# Patient Record
Sex: FEMALE | Race: WHITE | Employment: PART TIME | URBAN - METROPOLITAN AREA
[De-identification: names, ages, dates, MRNs, and addresses within clinical notes are randomized per-mention and may not be internally consistent; named-entity substitution may affect disease eponyms.]

---

## 2017-03-16 ENCOUNTER — VIRTUAL VISIT (OUTPATIENT)
Dept: FAMILY MEDICINE | Facility: OTHER | Age: 32
End: 2017-03-16

## 2017-03-17 NOTE — PROGRESS NOTES
Date:   Clinician: Breonna Waller  Clinician NPI: [id number]  Patient: Maria T Ruano  Patient : 1985  Patient Address: [address] [place], [place] [address]  Patient Phone: [phone]  Visit Protocol: URI  Patient Summary:  Maria T is a 31 year old ( : 1985 ) female who initiated a Zip for evaluation of Swimmer's ear (ear pain).     She denies headache.   Regarding the ear pain, the patient denies experiencing pain when gently pulling on the earlobe and recent injury to the area around the ear.   She reports having moderate ear pain on the ear canal area of the right ear for 1 day. The patient hears normally despite the ear pain.   In addition to the ear pain, Maria T also reports having the following symptoms:      A feeling of fullness in the ear as if it is clogged    Tinnitus     Maria T denies having swelling, tenderness, and redness on her ear.   Additionally, she finds the pain is worse while eating or chewing, finds the pain worsens if the mouth is open fully or the teeth are clenched, and does not experience pain when bending the chin to the chest.   She has never had tympanostomy tube placement. In the last two weeks the patient has had dental work or pain the mouth.   Within the past two (2) years she has had one episode of otitis media. Antibiotics were effective in treating the previous episode(s) of otitis media.   Maria T denies having COPD or other chronic lung disease.    Weight (in lbs): 288   She states she is not pregnant and denies breastfeeding. She is currently menstruating.   Maria T does not smoke or use smokeless tobacco.  MEDICATIONS:  Paroxetine (Paxil, Pexeva)  , ALLERGIES:    Patient free text response:   Pertussis vaccine     Clinician Response:  Dear Maria T,  Based upon the information you provided, you may have otitis externa. External otitis, also known as swimmer's ear, is a condition that occurs when the ear canal becomes irritated or  infected.  I am prescribing Neomycin/polymyxin B/hydrocortisone otic (Cortisporin) 1% Otic Solution. Instill 4 drops in the affected ear 3 to 4 times a day for 7 days.   Unless your are allergic to the over-the-counter medication(s) below, I recommend using:   Ibuprofen. Take 1-3 200mg tablets (200-600mg) every 8 hours to help with the discomfort. Make sure to take the ibuprofen with food. Do not exceed 2400mg in 24 hours. This is an over-the-counter medication that does not require a prescription.   Use of eardrops:     Lie on your side or tilt your head.    Insert the drops into your ear canal    Lie on your side or insert a cotton ball in the ear canal for 20 minutes    Use the medication for the number of days recommended, even if you begin to feel better within a few days after starting the drops.      You should start to feel better within 1-2 days. If your pain is increasing or is just not getting better, please see your health care clinician or urgent care clinic for follow up.   Avoid getting water inside your ear while you are being treated for swimmer's ear.  Use a cotton ball coated with petroleum jelly to protect your ears when bathing and showering.  Do not go swimming or diving for the next 7-10 days.   Some people develop allergies to antibiotics. If you notice a new rash, significant swelling or difficulty breathing, stop the medication immediately and go into a clinic for physical evaluation.   Some women may also develop a yeast infection as a side effect of taking antibiotics. If you notice symptoms of a yeast infection, please use Zipnosis to get treatment.   If you become pregnant during this course of treatment with medication, stop taking the medication and contact your primary care clinician.   Diagnosis: Otitis Externa  Diagnosis ICD: H60.399  Prescription: neomycin/polymyxin B/hydrocortisone otic (Cortisporin) 3.5 mg-10,000 units-10 mg 1ml otic solution 10 ml, 7 days supply. Instill 4  drops in the affected ear 3 to 4 times daily for 7 days. Refills: 0, Refill as needed: no, Allow substitutions: yes  Prescription Sent At: March 16 22:17:20, 2017  Pharmacy: [facility]/pharmacy #[id number] - [phone] - [address] [phone]

## 2017-03-29 ENCOUNTER — TRANSFERRED RECORDS (OUTPATIENT)
Dept: HEALTH INFORMATION MANAGEMENT | Facility: CLINIC | Age: 32
End: 2017-03-29

## 2017-05-30 ENCOUNTER — OFFICE VISIT (OUTPATIENT)
Dept: FAMILY MEDICINE | Facility: CLINIC | Age: 32
End: 2017-05-30
Payer: COMMERCIAL

## 2017-05-30 VITALS
WEIGHT: 293 LBS | HEART RATE: 116 BPM | HEIGHT: 66 IN | DIASTOLIC BLOOD PRESSURE: 98 MMHG | RESPIRATION RATE: 18 BRPM | TEMPERATURE: 98.6 F | BODY MASS INDEX: 47.09 KG/M2 | OXYGEN SATURATION: 95 % | SYSTOLIC BLOOD PRESSURE: 134 MMHG

## 2017-05-30 DIAGNOSIS — R30.0 DYSURIA: ICD-10-CM

## 2017-05-30 DIAGNOSIS — R35.0 URINARY FREQUENCY: Primary | ICD-10-CM

## 2017-05-30 DIAGNOSIS — N30.00 ACUTE CYSTITIS WITHOUT HEMATURIA: ICD-10-CM

## 2017-05-30 LAB
ALBUMIN UR-MCNC: NEGATIVE MG/DL
APPEARANCE UR: CLEAR
BILIRUB UR QL STRIP: NEGATIVE
COLOR UR AUTO: YELLOW
GLUCOSE UR STRIP-MCNC: NEGATIVE MG/DL
HGB UR QL STRIP: NEGATIVE
KETONES UR STRIP-MCNC: NEGATIVE MG/DL
LEUKOCYTE ESTERASE UR QL STRIP: ABNORMAL
MUCOUS THREADS #/AREA URNS LPF: PRESENT /LPF
NITRATE UR QL: NEGATIVE
PH UR STRIP: 5 PH (ref 5–7)
RBC #/AREA URNS AUTO: 1 /HPF (ref 0–2)
SP GR UR STRIP: 1.02 (ref 1–1.03)
SQUAMOUS #/AREA URNS AUTO: 2 /HPF (ref 0–1)
URN SPEC COLLECT METH UR: ABNORMAL
UROBILINOGEN UR STRIP-MCNC: 0 MG/DL (ref 0–2)
WBC #/AREA URNS AUTO: 3 /HPF (ref 0–2)

## 2017-05-30 PROCEDURE — 81001 URINALYSIS AUTO W/SCOPE: CPT | Performed by: FAMILY MEDICINE

## 2017-05-30 PROCEDURE — 99214 OFFICE O/P EST MOD 30 MIN: CPT | Performed by: FAMILY MEDICINE

## 2017-05-30 RX ORDER — NITROFURANTOIN 25; 75 MG/1; MG/1
100 CAPSULE ORAL 2 TIMES DAILY
Qty: 14 CAPSULE | Refills: 0 | Status: SHIPPED | OUTPATIENT
Start: 2017-05-30 | End: 2017-08-21

## 2017-05-30 ASSESSMENT — ANXIETY QUESTIONNAIRES
7. FEELING AFRAID AS IF SOMETHING AWFUL MIGHT HAPPEN: NOT AT ALL
5. BEING SO RESTLESS THAT IT IS HARD TO SIT STILL: SEVERAL DAYS
3. WORRYING TOO MUCH ABOUT DIFFERENT THINGS: NOT AT ALL
2. NOT BEING ABLE TO STOP OR CONTROL WORRYING: NOT AT ALL
GAD7 TOTAL SCORE: 4
1. FEELING NERVOUS, ANXIOUS, OR ON EDGE: SEVERAL DAYS
IF YOU CHECKED OFF ANY PROBLEMS ON THIS QUESTIONNAIRE, HOW DIFFICULT HAVE THESE PROBLEMS MADE IT FOR YOU TO DO YOUR WORK, TAKE CARE OF THINGS AT HOME, OR GET ALONG WITH OTHER PEOPLE: NOT DIFFICULT AT ALL
6. BECOMING EASILY ANNOYED OR IRRITABLE: SEVERAL DAYS

## 2017-05-30 ASSESSMENT — PATIENT HEALTH QUESTIONNAIRE - PHQ9: 5. POOR APPETITE OR OVEREATING: SEVERAL DAYS

## 2017-05-30 ASSESSMENT — PAIN SCALES - GENERAL: PAINLEVEL: MILD PAIN (3)

## 2017-05-30 NOTE — PROGRESS NOTES
"  SUBJECTIVE:                                                    Maria T Armando is a 32 year old female who presents to clinic today for the following health issues:      Depression Followup    Status since last visit: Stable  Planning for further education in nursing     See PHQ-9 for current symptoms.  Other associated symptoms: None    Complicating factors:   Significant life event:  No   Current substance abuse:  None  Anxiety or Panic symptoms:  No    PHQ-9  English PHQ-9   Any Language            Amount of exercise or physical activity: None    Problems taking medications regularly: No    Medication side effects: none    Diet: regular (no restrictions)          Problem list and histories reviewed & adjusted, as indicated.  Additional history: as documented    Current Outpatient Prescriptions   Medication Sig Dispense Refill     Prenatal Vit-Fe Fumarate-FA (PRENATAL PO)        nitrofurantoin, macrocrystal-monohydrate, (MACROBID) 100 MG capsule Take 1 capsule (100 mg) by mouth 2 times daily 14 capsule 0     PARoxetine (PAXIL) 30 MG tablet Take 1 tablet (30 mg) by mouth At Bedtime 90 tablet 4     loperamide (IMODIUM A-D) 2 MG tablet Take two tablets now then one tab with every loose stool until diarrhea stops. 20 tablet 1     acetaminophen (TYLENOL) 500 MG tablet Take 500-1,000 mg by mouth every 6 hours as needed historical       CLARITIN-D 12 HOUR PO 1 tablets as needed         Reviewed and updated as needed this visit by clinical staff       Reviewed and updated as needed this visit by Provider         ROS:  Constitutional, HEENT, cardiovascular, pulmonary, gi and gu systems are negative, except as otherwise noted.    OBJECTIVE:                                                    BP (!) 134/98 (BP Location: Other (Comment), Patient Position: Chair, Cuff Size: Adult Regular)  Pulse 116  Temp 98.6  F (37  C) (Tympanic)  Resp 18  Ht 5' 6\" (1.676 m)  Wt 293 lb 12 oz (133.2 kg)  LMP 04/15/2017 (Exact Date) "  SpO2 95%  BMI 47.41 kg/m2  Body mass index is 47.41 kg/(m^2).   GENERAL: healthy, alert and no distress  Suprapubic tenderness present      Diagnostic Test Results:  Results for orders placed or performed in visit on 05/30/17 (from the past 24 hour(s))   *UA reflex to Microscopic and Culture ([place]; [facility]; [facility]; [facility]; [facility]; [facility]; [place]; [place])   Result Value Ref [place]    Color Urine Yellow     Appearance Urine Clear     Glucose Urine Negative NEG mg/dL    Bilirubin Urine Negative NEG    Ketones Urine Negative NEG mg/dL    Specific Gravity Urine 1.018 1.003 - 1.035    Blood Urine Negative NEG    pH Urine 5.0 5.0 - 7.0 pH    Protein Albumin Urine Negative NEG mg/dL    Urobilinogen mg/dL 0.0 0.0 - 2.0 mg/dL    Nitrite Urine Negative NEG    Leukocyte Esterase Urine Small (A) NEG    Source Unspecified Urine     RBC Urine 1 0 - 2 /HPF    WBC Urine 3 (H) 0 - 2 /HPF    Squamous Epithelial /HPF Urine 2 (H) 0 - 1 /HPF    Mucous Urine Present (A) NEG /LPF          ASSESSMENT:                                                        PLAN:                                                    1. Urinary frequency    - *UA reflex to Microscopic and Culture ([facility]; [facility]; [facility]; [facility]; [facility]; [place]; [place])    2. Dysuria    - reflex to Microscopic and Culture ([facility]; [facility]; [facility]; [facility]; [facility]; [place]; [place])    3. Acute cystitis without hematuria    - nitrofurantoin, macrocrystal-monohydrate, (MACROBID) 100 MG capsule; Take 1 capsule (100 mg) by mouth 2 times daily  Dispense: 14 capsule; Refill: 0    The patient and or Parent was instructed to call or return if there is no improvement or a increase in the symptoms or condition.    4. Mild Major Depression :  Stable discussed cutting the dose in half patient considering pregnancy, will follow up in two months          Work on weight loss  Regular exercise    Fernando Quigley MD  [facility]

## 2017-05-30 NOTE — MR AVS SNAPSHOT
"              After Visit Summary   5/30/2017    Maria T Armando    MRN: [id number]           Patient Information     Date Of Birth          1985        Visit Information        Provider Department      5/30/2017 10:00 AM Fernando Quigley MD [facility]        Today's Diagnoses     Urinary frequency    -  1    Dysuria        Acute cystitis without hematuria           Follow-ups after your visit        Who to contact     If you have questions or need follow up information about today's clinic visit or your schedule please contact [facility] directly at [phone].  Normal or non-critical lab and imaging results will be communicated to you by ThromboVisionhart, letter or phone within 4 business days after the clinic has received the results. If you do not hear from us within 7 days, please contact the clinic through [employer] or phone. If you have a critical or abnormal lab result, we will notify you by phone as soon as possible.  Submit refill requests through [employer] or call your pharmacy and they will forward the refill request to us. Please allow 3 business days for your refill to be completed.          Additional Information About Your Visit        MyChart Information     [employer] gives you secure access to your electronic health record. If you see a primary care provider, you can also send messages to your care team and make appointments. If you have questions, please call your primary care clinic.  If you do not have a primary care provider, please call [phone] and they will assist you.        Care EveryWhere ID     This is your Care EveryWhere ID. This could be used by other organizations to access your [facility] medical records  [id number]        Your Vitals Were     Pulse Temperature Respirations Height Last Period Pulse Oximetry    116 98.6  F (37  C) (Tympanic) 18 5' 6\" (1.676 m) 04/15/2017 (Exact Date) 95%    BMI (Body Mass Index)                   47.41 kg/m2            " Blood Pressure from Last 3 Encounters:   05/30/17 (!) 134/98   12/01/16 126/86   05/05/16 134/86    Weight from Last 3 Encounters:   05/30/17 293 lb 12 oz (133.2 kg)   12/01/16 285 lb 6.4 oz (129.5 kg)   05/05/16 283 lb (128.4 kg)              We Performed the Following     *UA reflex to Microscopic and Culture ([place]; [facility]; [facility]; [facility]; [facility]; [facility]; [place]; [place])          Today's Medication Changes          These changes are accurate as of: 5/30/17 11:20 AM.  If you have any questions, ask your nurse or doctor.               Start taking these medicines.        Dose/Directions    nitrofurantoin (macrocrystal-monohydrate) 100 MG capsule   Commonly known as:  MACROBID   Used for:  Acute cystitis without hematuria   Started by:  Fernando Quigley MD        Dose:  100 mg   Take 1 capsule (100 mg) by mouth 2 times daily   Quantity:  14 capsule   Refills:  0            Where to get your medicines      These medications were sent to [facility]/pharmacy #[id number] - [place] - [address]  [address] [place] [address]     Phone:  [phone]     nitrofurantoin (macrocrystal-monohydrate) 100 MG capsule                Primary Care Provider Office Phone # Fax #    Fernando Quigley [phone]       [address]   [facility] [address]        Thank you!     Thank you for choosing [facility]  for your care. Our goal is always to provide you with excellent care. Hearing back from our patients is one way we can continue to improve our services. Please take a few minutes to complete the written survey that you may receive in the mail after your visit with us. Thank you!             Your Updated Medication List - Protect others around you: Learn how to safely use, store and throw away your medicines at www.disposemymeds.org.          This list is accurate as of: 5/30/17 11:20 AM.  Always use your most recent med  list.                   Brand Name Dispense Instructions for use    CLARITIN-D 12 HOUR PO      1 tablets as needed       loperamide 2 MG tablet    IMODIUM A-D    20 tablet    Take two tablets now then one tab with every loose stool until diarrhea stops.       nitrofurantoin (macrocrystal-monohydrate) 100 MG capsule    MACROBID    14 capsule    Take 1 capsule (100 mg) by mouth 2 times daily       PARoxetine 30 MG tablet    PAXIL    90 tablet    Take 1 tablet (30 mg) by mouth At Bedtime       PRENATAL PO          TYLENOL 500 MG tablet   Generic drug:  acetaminophen      Take 500-1,000 mg by mouth every 6 hours as needed historical

## 2017-05-30 NOTE — NURSING NOTE
"Chief Complaint   Patient presents with     Depression     follow up     Anxiety     follow up     UTI     frequency x1w, dysuria, hesitation, back and abdominal pain, incontinence       Initial BP (!) 134/98 (BP Location: Other (Comment), Patient Position: Chair, Cuff Size: Adult Regular)  Pulse 116  Temp 98.6  F (37  C) (Tympanic)  Resp 18  Ht 5' 6\" (1.676 m)  Wt 293 lb 12 oz (133.2 kg)  LMP 04/15/2017 (Exact Date)  SpO2 95%  BMI 47.41 kg/m2 Estimated body mass index is 47.41 kg/(m^2) as calculated from the following:    Height as of this encounter: 5' 6\" (1.676 m).    Weight as of this encounter: 293 lb 12 oz (133.2 kg).  Medication Reconciliation: complete   Health Maintenance Due   Topic Date Due     PHQ-9 Q6 MONTHS  06/01/2017     Lilia Strong, [facility]      "

## 2017-05-31 ASSESSMENT — PATIENT HEALTH QUESTIONNAIRE - PHQ9: SUM OF ALL RESPONSES TO PHQ QUESTIONS 1-9: 5

## 2017-05-31 ASSESSMENT — ANXIETY QUESTIONNAIRES: GAD7 TOTAL SCORE: 4

## 2017-08-21 ENCOUNTER — OFFICE VISIT (OUTPATIENT)
Dept: OBGYN | Facility: OTHER | Age: 32
End: 2017-08-21
Payer: COMMERCIAL

## 2017-08-21 VITALS
SYSTOLIC BLOOD PRESSURE: 134 MMHG | BODY MASS INDEX: 47.45 KG/M2 | HEART RATE: 88 BPM | DIASTOLIC BLOOD PRESSURE: 90 MMHG | WEIGHT: 293 LBS

## 2017-08-21 DIAGNOSIS — N92.0 EXCESSIVE OR FREQUENT MENSTRUATION: Primary | ICD-10-CM

## 2017-08-21 LAB
ERYTHROCYTE [DISTWIDTH] IN BLOOD BY AUTOMATED COUNT: 12.5 % (ref 10–15)
ESTRADIOL SERPL-MCNC: 14 PG/ML
FSH SERPL-ACNC: 5.8 IU/L
HCT VFR BLD AUTO: 42.6 % (ref 35–47)
HGB BLD-MCNC: 14 G/DL (ref 11.7–15.7)
LH SERPL-ACNC: 1.9 IU/L
MCH RBC QN AUTO: 28.8 PG (ref 26.5–33)
MCHC RBC AUTO-ENTMCNC: 32.9 G/DL (ref 31.5–36.5)
MCV RBC AUTO: 88 FL (ref 78–100)
PLATELET # BLD AUTO: 311 10E9/L (ref 150–450)
PROGEST SERPL-MCNC: <0.2 NG/ML
RBC # BLD AUTO: 4.86 10E12/L (ref 3.8–5.2)
TSH SERPL DL<=0.005 MIU/L-ACNC: 3.56 MU/L (ref 0.4–4)
WBC # BLD AUTO: 6.8 10E9/L (ref 4–11)

## 2017-08-21 PROCEDURE — 82627 DEHYDROEPIANDROSTERONE: CPT | Performed by: OBSTETRICS & GYNECOLOGY

## 2017-08-21 PROCEDURE — 36415 COLL VENOUS BLD VENIPUNCTURE: CPT | Performed by: OBSTETRICS & GYNECOLOGY

## 2017-08-21 PROCEDURE — 84270 ASSAY OF SEX HORMONE GLOBUL: CPT | Performed by: OBSTETRICS & GYNECOLOGY

## 2017-08-21 PROCEDURE — 84403 ASSAY OF TOTAL TESTOSTERONE: CPT | Performed by: OBSTETRICS & GYNECOLOGY

## 2017-08-21 PROCEDURE — 84144 ASSAY OF PROGESTERONE: CPT | Performed by: OBSTETRICS & GYNECOLOGY

## 2017-08-21 PROCEDURE — 82670 ASSAY OF TOTAL ESTRADIOL: CPT | Performed by: OBSTETRICS & GYNECOLOGY

## 2017-08-21 PROCEDURE — 84443 ASSAY THYROID STIM HORMONE: CPT | Performed by: OBSTETRICS & GYNECOLOGY

## 2017-08-21 PROCEDURE — 83001 ASSAY OF GONADOTROPIN (FSH): CPT | Performed by: OBSTETRICS & GYNECOLOGY

## 2017-08-21 PROCEDURE — 85027 COMPLETE CBC AUTOMATED: CPT | Performed by: OBSTETRICS & GYNECOLOGY

## 2017-08-21 PROCEDURE — 83002 ASSAY OF GONADOTROPIN (LH): CPT | Performed by: OBSTETRICS & GYNECOLOGY

## 2017-08-21 PROCEDURE — 99204 OFFICE O/P NEW MOD 45 MIN: CPT | Performed by: OBSTETRICS & GYNECOLOGY

## 2017-08-21 ASSESSMENT — PAIN SCALES - GENERAL: PAINLEVEL: MODERATE PAIN (4)

## 2017-08-21 NOTE — NURSING NOTE
"Chief Complaint   Patient presents with     Amenorrhea     Has not had a peroid since June 2nd and negative pregnancy tests       Initial /90  Pulse 88  Wt 294 lb (133.4 kg)  LMP 08/19/2017  BMI 47.45 kg/m2 Estimated body mass index is 47.45 kg/(m^2) as calculated from the following:    Height as of 5/30/17: 5' 6\" (1.676 m).    Weight as of this encounter: 294 lb (133.4 kg).  Medication Reconciliation: complete  "

## 2017-08-21 NOTE — MR AVS SNAPSHOT
After Visit Summary   8/21/2017    Maria T Armando    MRN: [id number]           Patient Information     Date Of Birth          1985        Visit Information        Provider Department      8/21/2017 11:30 AM Nancy Kaplan,  [facility]        Today's Diagnoses     Excessive or frequent menstruation    -  1      Care Instructions    Please call if you any questions.    [facility]  [address] [place]   [address]  [phone]        Nancy Kaplan            Follow-ups after your visit        Follow-up notes from your care team     Return in about 4 weeks (around 9/18/2017).      Your next 10 appointments already scheduled     Aug 22, 2017  9:10 AM CDT   US PELVIC COMPLETE W TRANSVAGINAL with ERUS1   [facility] ([facility])    [address] [facility] [address] [phone]           Please bring a list of your medicines (including vitamins, minerals and over-the-counter drugs). Also, tell your doctor about any allergies you may have. Wear comfortable clothes and leave your valuables at home.  Adults: Drink six 8-ounce glasses of fluid one hour before your exam. Do NOT empty your bladder.  If you need to empty your bladder before your exam, try to release only a little bit of urine. Then, drink another 8oz glass of fluid.  Children: Children who are potty trained should drink at least 4 cups (32 oz) of liquid 45 minutes to one hour prior to the exam. The child s bladder must be full in order to achieve a diagnostic exam. If your child is very uncomfortable or has an urgent need to pee, please notify a technologist; they will try to find out how much longer the wait may be and provide instructions to help relieve the pressure. Occasionally it is medically necessary to insert a urinary catheter to fill the bladder.  Please call the Imaging Department at your exam site with any questions.              Future  tests that were ordered for you today     Open Future Orders        Priority Expected Expires Ordered    US Pelvic Complete with Transvaginal Routine  11/19/2017 8/21/2017            Who to contact     If you have questions or need follow up information about today's clinic visit or your schedule please contact [facility] [place] directly at [phone].  Normal or non-critical lab and imaging results will be communicated to you by MyChart, letter or phone within 4 business days after the clinic has received the results. If you do not hear from us within 7 days, please contact the clinic through GreenL[employer] or phone. If you have a critical or abnormal lab result, we will notify you by phone as soon as possible.  Submit refill requests through [employer] or call your pharmacy and they will forward the refill request to us. Please allow 3 business days for your refill to be completed.          Additional Information About Your Visit        GreenLancerhart Information     [employer] gives you secure access to your electronic health record. If you see a primary care provider, you can also send messages to your care team and make appointments. If you have questions, please call your primary care clinic.  If you do not have a primary care provider, please call [phone] and they will assist you.        Care EveryWhere ID     This is your Care EveryWhere ID. This could be used by other organizations to access your [facility] medical records  [id number]        Your Vitals Were     Pulse Last Period BMI (Body Mass Index)             88 08/19/2017 47.45 kg/m2          Blood Pressure from Last 3 Encounters:   08/21/17 134/90   05/30/17 (!) 134/98   12/01/16 126/86    Weight from Last 3 Encounters:   08/21/17 294 lb (133.4 kg)   05/30/17 293 lb 12 oz (133.2 kg)   12/01/16 285 lb 6.4 oz (129.5 kg)              We Performed the Following     CBC with platelets     DHEA sulfate     Estradiol     Follicle stimulating hormone     Lutropin      Progesterone     Testosterone Free and Total     TSH        Primary Care Provider Office Phone # Fax #    Fernando Quigley [phone] [address] [place] [address]        Equal Access to Services     JUANIS CERDA : Dahlia jay ng milado Soalfredoali, waaxda luqadaha, qaybta kaalmada adedanii, akila garlandfranchesca cloud. So [facility] [phone].    ATENCIÓN: Si habla español, tiene a doty disposición servicios gratuitos de asistencia lingüística. Llame al [phone].    We comply with applicable federal civil rights laws and [place] laws. We do not discriminate on the basis of race, color, national origin, age, disability sex, sexual orientation or gender identity.            Thank you!     Thank you for choosing [facility]  for your care. Our goal is always to provide you with excellent care. Hearing back from our patients is one way we can continue to improve our services. Please take a few minutes to complete the written survey that you may receive in the mail after your visit with us. Thank you!             Your Updated Medication List - Protect others around you: Learn how to safely use, store and throw away your medicines at www.disposemymeds.org.          This list is accurate as of: 8/21/17  1:05 PM.  Always use your most recent med list.                   Brand Name Dispense Instructions for use Diagnosis    CLARITIN-D 12 HOUR PO      1 tablets as needed        loperamide 2 MG tablet    IMODIUM A-D    20 tablet    Take two tablets now then one tab with every loose stool until diarrhea stops.    Diarrhea       PARoxetine 30 MG tablet    PAXIL    90 tablet    Take 1 tablet (30 mg) by mouth At Bedtime    Major depressive disorder, recurrent episode, mild (H)       PRENATAL PO           TYLENOL 500 MG tablet   Generic drug:  acetaminophen      Take 500-1,000 mg by mouth every 6 hours as needed historical

## 2017-08-21 NOTE — PROGRESS NOTES
Subjective  32 year old non-pregnant female presents today complaining of menometrorrhagia.  Patient states she had normal menses up until 2015  She had normal monthly cycles.  Since then she would skip months at a time.  Patient has never been dx with PCOS.  She has not had any pregnancies.  She has been trying to get pregnant.  This cycle started Saturday and is heavier then normal.  Patient uses tampons and is changing them every 2 hours.  Some dysmenorrhea.  Patient has male hair growth with chin hair.  BMI is 47.  Patient complains of dizziness and lightheadedness since this cycle started.  Patient is sexually active.  No dyspareunia.  No vaginal spotting after.  No problems urinating.  Bm=diarrhea a lot.  We discussed getting baseline labs and an ultrasound today.  Patient agrees.            ROS: 10 point ROS neg other than the symptoms noted above in the HPI.  Past Medical History:   Diagnosis Date     Allergy      TREY (obstructive sleep apnea) 11/3/2014    AHI 10.1 Oxygen Abraham 79%     Past Surgical History:   Procedure Laterality Date     DENTAL SURGERY      wisdom teeth removed.      Family History   Problem Relation Age of Onset     Lipids Mother      TREY     Hypertension Mother      DIABETES Mother      Obesity Mother      Family History Negative Father      snores     GASTROINTESTINAL DISEASE Father      Depression Father      Breast Cancer Maternal Grandmother      Social History   Substance Use Topics     Smoking status: Never Smoker     Smokeless tobacco: Never Used     Alcohol use 0.6 oz/week     1 Standard drinks or equivalent per week      Comment: 1 monthly         Objective  Vitals: /90  Pulse 88  Wt 294 lb (133.4 kg)  LMP 08/19/2017  BMI 47.45 kg/m2  BMI= Body mass index is 47.45 kg/(m^2).    General appearance=no deformities noted, obese  Psych=mood is stable  Skin=no rashes or lesions seen  Neck=overall appearance is normal  Thyroid=no enlargement  Lungs=non-labored breathing, no  use of accessory muscles   Abd=soft, Nontender/nondistended, +bowel sounds x4, no masses, no signs of hernias, no evidence of hepatosplenomegaly  PELVIC:    External genitalia: normal without lesions or masses  Urethral meatus: no lesions or prolapse noted, normal size  Urethra: no masses, non tender  Bladder: non tender, no fullness  Vagina: normal mucosa and rugae, no discharge, large amount of blood in vaginal vault  Cervix: normal without lesion, no cervical motion tenderness, healthy  Uterus: small, mobile, nontender.  Adnexa: non tender, without masses  Rectal: deffered  Ext=no clubbing or cyanosis, no swelling      Assessment  1.)  Menometrorrhagia  2.)  Likely PCOS  3.)  Infertility      Plan  1.)  Pelvic ultrasound, TSH, CBC  2.)  PCOS labs  3.)  Pelvic ultrasound       30 minutes was spent face to face with the patient today discussing her history, diagnosis, and follow-up plan as noted above.  Over 50% of the visit was spent in counseling and coordination of care.        Nursing notes read and reviewed    Nancy Kaplan

## 2017-08-22 ENCOUNTER — RADIANT APPOINTMENT (OUTPATIENT)
Dept: ULTRASOUND IMAGING | Facility: OTHER | Age: 32
End: 2017-08-22
Attending: OBSTETRICS & GYNECOLOGY
Payer: COMMERCIAL

## 2017-08-22 DIAGNOSIS — N92.0 EXCESSIVE OR FREQUENT MENSTRUATION: ICD-10-CM

## 2017-08-22 LAB — DHEA-S SERPL-MCNC: 73 UG/DL (ref 35–430)

## 2017-08-22 PROCEDURE — 76830 TRANSVAGINAL US NON-OB: CPT

## 2017-08-22 PROCEDURE — 76856 US EXAM PELVIC COMPLETE: CPT

## 2017-08-23 LAB
SHBG SERPL-SCNC: 26 NMOL/L (ref 30–135)
TESTOST FREE SERPL-MCNC: 0.18 NG/DL (ref 0.08–0.74)
TESTOST SERPL-MCNC: 9 NG/DL (ref 8–60)

## 2017-10-10 ENCOUNTER — OFFICE VISIT (OUTPATIENT)
Dept: OBGYN | Facility: CLINIC | Age: 32
End: 2017-10-10
Payer: COMMERCIAL

## 2017-10-10 VITALS
BODY MASS INDEX: 44.55 KG/M2 | SYSTOLIC BLOOD PRESSURE: 132 MMHG | HEART RATE: 85 BPM | WEIGHT: 276 LBS | DIASTOLIC BLOOD PRESSURE: 88 MMHG

## 2017-10-10 DIAGNOSIS — N92.6 IRREGULAR MENSTRUAL CYCLE: Primary | ICD-10-CM

## 2017-10-10 LAB
ALBUMIN SERPL-MCNC: 3.5 G/DL (ref 3.4–5)
ALP SERPL-CCNC: 53 U/L (ref 40–150)
ALT SERPL W P-5'-P-CCNC: 29 U/L (ref 0–50)
ANION GAP SERPL CALCULATED.3IONS-SCNC: 7 MMOL/L (ref 3–14)
AST SERPL W P-5'-P-CCNC: 21 U/L (ref 0–45)
BILIRUB SERPL-MCNC: 0.2 MG/DL (ref 0.2–1.3)
BUN SERPL-MCNC: 7 MG/DL (ref 7–30)
CALCIUM SERPL-MCNC: 8.5 MG/DL (ref 8.5–10.1)
CHLORIDE SERPL-SCNC: 108 MMOL/L (ref 94–109)
CO2 SERPL-SCNC: 23 MMOL/L (ref 20–32)
CREAT SERPL-MCNC: 0.77 MG/DL (ref 0.52–1.04)
GFR SERPL CREATININE-BSD FRML MDRD: 88 ML/MIN/1.7M2
GLUCOSE SERPL-MCNC: 101 MG/DL (ref 70–99)
POTASSIUM SERPL-SCNC: 4.2 MMOL/L (ref 3.4–5.3)
PROT SERPL-MCNC: 7.2 G/DL (ref 6.8–8.8)
SODIUM SERPL-SCNC: 138 MMOL/L (ref 133–144)

## 2017-10-10 PROCEDURE — 80053 COMPREHEN METABOLIC PANEL: CPT | Performed by: OBSTETRICS & GYNECOLOGY

## 2017-10-10 PROCEDURE — 36415 COLL VENOUS BLD VENIPUNCTURE: CPT | Performed by: OBSTETRICS & GYNECOLOGY

## 2017-10-10 PROCEDURE — 99213 OFFICE O/P EST LOW 20 MIN: CPT | Performed by: OBSTETRICS & GYNECOLOGY

## 2017-10-10 RX ORDER — METFORMIN HYDROCHLORIDE 750 MG/1
750 TABLET, EXTENDED RELEASE ORAL
Qty: 30 TABLET | Refills: 1 | Status: SHIPPED | OUTPATIENT
Start: 2017-10-10 | End: 2017-12-03

## 2017-10-10 ASSESSMENT — PAIN SCALES - GENERAL: PAINLEVEL: MILD PAIN (2)

## 2017-10-10 NOTE — PROGRESS NOTES
Subjective  32 year old non-pregnant female presents today as a follow up to discuss her ultrasound and labs.  I saw patient a few weeks ago when she complained of menometrorrhagia.  Patient states she had normal menses up until 2015.  She had normal monthly cycles.  Since then she would skip months at a time.  Patient has never been dx with PCOS.  She has not had any pregnancies.  She has been trying to get pregnant and has been for 6 months.  Pt's last normal cycle was 6 weeks ago.  Patient uses tampons and is changing them every 2 hours.  Some dysmenorrhea.  Patient has male hair growth with chin hair.  BMI is 47.  Patient is sexually active.  No dyspareunia.  No vaginal spotting after.  No problems urinating.  Bm=diarrhea a lot.  Pt walks occasionally.  We discussed the possibility of PCOS.  Patient is not wanting to try any form of hormones now because she trying for a pregnancy.          ROS: 10 point ROS neg other than the symptoms noted above in the HPI.  Past Medical History:   Diagnosis Date     Allergy      TREY (obstructive sleep apnea) 11/3/2014    AHI 10.1 Oxygen Abraham 79%     Past Surgical History:   Procedure Laterality Date     DENTAL SURGERY      wisdom teeth removed.      Family History   Problem Relation Age of Onset     Lipids Mother      TREY     Hypertension Mother      DIABETES Mother      Obesity Mother      Family History Negative Father      snores     GASTROINTESTINAL DISEASE Father      Depression Father      Breast Cancer Maternal Grandmother      Social History   Substance Use Topics     Smoking status: Never Smoker     Smokeless tobacco: Never Used     Alcohol use 0.6 oz/week     1 Standard drinks or equivalent per week      Comment: 1 monthly         Objective  Vitals: /88  Pulse 85  Wt 125.2 kg (276 lb)  LMP 08/21/2017  BMI 44.55 kg/m2  BMI= Body mass index is 44.55 kg/(m^2).        Pelvic ultrasound=8/22/17:  FINDINGS: The uterus is normal in size, shape and  echotexture  measuring 8.0 x 4.2 x 4.9 cm. Endometrium is 0.4 cm thick and appears  normal.     The ovaries are normal in size, shape and echotexture with no focal  lesions, measuring 3.0 x 2.5 x 2.3 cm on the right and 2.2 x 1.8 x 1.9  cm on the left. There are no adnexal masses. There is no free fluid in  the cul-de-sac.           IMPRESSION: Normal pelvic ultrasound. Etiology for patient's symptoms  is not identified on this study.      Assessment  1.)  PCOS  2.)  Obese      Plan  1.)  Metformin ordered  2.)  Exercise encouraged  3.)  Follow up in 3 months      25 minutes was spent face to face with the patient today discussing her history, diagnosis, and follow-up plan as noted above.  Over 50% of the visit was spent in counseling and coordination of care.        Nursing notes read and reviewed    Nancy Kaplan

## 2017-10-10 NOTE — NURSING NOTE
"Chief Complaint   Patient presents with     RECHECK     was seen for menometorrhagia in August -   Had US       Initial /88  Pulse 85  Wt 125.2 kg (276 lb)  LMP 08/21/2017  BMI 44.55 kg/m2 Estimated body mass index is 44.55 kg/(m^2) as calculated from the following:    Height as of 5/30/17: 1.676 m (5' 6\").    Weight as of this encounter: 125.2 kg (276 lb).  Medication Reconciliation: complete  "

## 2017-10-10 NOTE — MR AVS SNAPSHOT
After Visit Summary   10/10/2017    Maria T Armando    MRN: [id number]           Patient Information     Date Of Birth          1985        Visit Information        Provider Department      10/10/2017 11:30 AM Nancy Kaplan DO [facility]        Today's Diagnoses     Irregular menstrual cycle    -  1       Follow-ups after your visit        Who to contact     If you have questions or need follow up information about today's clinic visit or your schedule please contact [facility] directly at [phone].  Normal or non-critical lab and imaging results will be communicated to you by [employer], letter or phone within 4 business days after the clinic has received the results. If you do not hear from us within 7 days, please contact the clinic through [employer] or phone. If you have a critical or abnormal lab result, we will notify you by phone as soon as possible.  Submit refill requests through [employer] or call your pharmacy and they will forward the refill request to us. Please allow 3 business days for your refill to be completed.          Additional Information About Your Visit        MyChart Information     [employer] gives you secure access to your electronic health record. If you see a primary care provider, you can also send messages to your care team and make appointments. If you have questions, please call your primary care clinic.  If you do not have a primary care provider, please call [phone] and they will assist you.        Care EveryWhere ID     This is your Care EveryWhere ID. This could be used by other organizations to access your [facility] medical records  [id number]        Your Vitals Were     Pulse Last Period BMI (Body Mass Index)             85 08/21/2017 44.55 kg/m2          Blood Pressure from Last 3 Encounters:   10/10/17 132/88   08/21/17 134/90   05/30/17 (!) 134/98    Weight from Last 3 Encounters:   10/10/17 125.2 kg (276 lb)    08/21/17 133.4 kg (294 lb)   05/30/17 133.2 kg (293 lb 12 oz)              We Performed the Following     Comprehensive metabolic panel          Today's Medication Changes          These changes are accurate as of: 10/10/17  1:42 PM.  If you have any questions, ask your nurse or doctor.               Start taking these medicines.        Dose/Directions    metFORMIN 750 MG 24 hr tablet   Commonly known as:  GLUCOPHAGE-XR   Used for:  Irregular menstrual cycle   Started by:  Nancy Kaplan DO        Dose:  750 mg   Take 1 tablet (750 mg) by mouth daily (with dinner)   Quantity:  30 tablet   Refills:  1            Where to get your medicines      These medications were sent to [facility]/pharmacy #[id number] - [place], [place] - [address]  [address] [address]     Phone:  [phone]     metFORMIN 750 MG 24 hr tablet                Primary Care Provider Office Phone # Fax #    Fernando Quigley [phone] [address] [place] [address]        Equal Access to Services     [facility] AH: Hadii aad ku hadasho Soomaali, waaxda luqadaha, qaybta kaalmada adeegyada, waxay brett hayfranchesca rubin . So [facility] [phone].    ATENCIÓN: Si habla español, tiene a doty disposición servicios gratuitos de asistencia lingüística. [facility] [phone].    We comply with applicable federal civil rights laws and [place] laws. We do not discriminate on the basis of race, color, national origin, age, disability, sex, sexual orientation, or gender identity.            Thank you!     Thank you for choosing [facility]  for your care. Our goal is always to provide you with excellent care. Hearing back from our patients is one way we can continue to improve our services. Please take a few minutes to complete the written survey that you may receive in the mail after your visit with us. Thank you!             Your Updated Medication List - Protect others around you: Learn  how to safely use, store and throw away your medicines at www.disposemymeds.org.          This list is accurate as of: 10/10/17  1:42 PM.  Always use your most recent med list.                   Brand Name Dispense Instructions for use Diagnosis    CLARITIN-D 12 HOUR PO      1 tablets as needed        loperamide 2 MG tablet    IMODIUM A-D    20 tablet    Take two tablets now then one tab with every loose stool until diarrhea stops.    Diarrhea       metFORMIN 750 MG 24 hr tablet    GLUCOPHAGE-XR    30 tablet    Take 1 tablet (750 mg) by mouth daily (with dinner)    Irregular menstrual cycle       PARoxetine 30 MG tablet    PAXIL    90 tablet    Take 1 tablet (30 mg) by mouth At Bedtime    Major depressive disorder, recurrent episode, mild (H)       PRENATAL PO           TYLENOL 500 MG tablet   Generic drug:  acetaminophen      Take 500-1,000 mg by mouth every 6 hours as needed historical

## 2017-12-18 ENCOUNTER — OFFICE VISIT (OUTPATIENT)
Dept: OBGYN | Facility: OTHER | Age: 32
End: 2017-12-18
Payer: COMMERCIAL

## 2017-12-18 VITALS
SYSTOLIC BLOOD PRESSURE: 138 MMHG | HEART RATE: 98 BPM | WEIGHT: 264 LBS | DIASTOLIC BLOOD PRESSURE: 82 MMHG | BODY MASS INDEX: 42.61 KG/M2

## 2017-12-18 DIAGNOSIS — N92.6 IRREGULAR MENSTRUAL CYCLE: ICD-10-CM

## 2017-12-18 DIAGNOSIS — E28.2 PCOS (POLYCYSTIC OVARIAN SYNDROME): Primary | ICD-10-CM

## 2017-12-18 DIAGNOSIS — F41.1 ANXIETY STATE: ICD-10-CM

## 2017-12-18 DIAGNOSIS — F32.0 MILD MAJOR DEPRESSION (H): ICD-10-CM

## 2017-12-18 PROCEDURE — 99213 OFFICE O/P EST LOW 20 MIN: CPT | Performed by: OBSTETRICS & GYNECOLOGY

## 2017-12-18 RX ORDER — METFORMIN HYDROCHLORIDE 750 MG/1
750 TABLET, EXTENDED RELEASE ORAL
Qty: 30 TABLET | Refills: 3 | Status: SHIPPED | OUTPATIENT
Start: 2017-12-18 | End: 2018-06-05

## 2017-12-18 ASSESSMENT — PAIN SCALES - GENERAL: PAINLEVEL: NO PAIN (0)

## 2017-12-18 NOTE — MR AVS SNAPSHOT
After Visit Summary   12/18/2017    Maria T Armando    MRN: [id number]           Patient Information     Date Of Birth          1985        Visit Information        Provider Department      12/18/2017 11:45 AM Nancy Kaplan DO [facility]        Today's Diagnoses     PCOS (polycystic ovarian syndrome)    -  1    Irregular menstrual cycle        Anxiety state        Mild major depression (H)          Care Instructions    Please call if you any questions.    [address]   [address]  [phone]        Nancy Kaplan            Follow-ups after your visit        Follow-up notes from your care team     Return in about 3 months (around 3/18/2018).      Who to contact     If you have questions or need follow up information about today's clinic visit or your schedule please contact [facility] directly at [phone].  Normal or non-critical lab and imaging results will be communicated to you by MyChart, letter or phone within 4 business days after the clinic has received the results. If you do not hear from us within 7 days, please contact the clinic through [employer] or phone. If you have a critical or abnormal lab result, we will notify you by phone as soon as possible.  Submit refill requests through [employer] or call your pharmacy and they will forward the refill request to us. Please allow 3 business days for your refill to be completed.          Additional Information About Your Visit        MyChart Information     [employer] gives you secure access to your electronic health record. If you see a primary care provider, you can also send messages to your care team and make appointments. If you have questions, please call your primary care clinic.  If you do not have a primary care provider, please call [phone] and they will assist you.        Care EveryWhere ID     This is your Care EveryWhere ID. This could be used  by other organizations to access your [facility] medical records  [id number]        Your Vitals Were     Pulse Last Period BMI (Body Mass Index)             98 12/10/2017 42.61 kg/m2          Blood Pressure from Last 3 Encounters:   12/18/17 138/82   10/10/17 132/88   08/21/17 134/90    Weight from Last 3 Encounters:   12/18/17 264 lb (119.7 kg)   10/10/17 276 lb (125.2 kg)   08/21/17 294 lb (133.4 kg)              Today, you had the following     No orders found for display         Where to get your medicines      These medications were sent to [facility]/pharmacy #[id number] - [place], [place] - [address]  [address] [place] [address]     Phone:  [phone]     metFORMIN 750 MG 24 hr tablet          Primary Care Provider Office Phone # Fax #    Fernando Quigley [phone]       [address] [place] [address]        Equal Access to Services     [facility]: Hadii aad ku hadasho Soomaali, waaxda luqadaha, qaybta kaalmada adeegyada, waxay brett rubin . So [facility] [phone].    ATENCIÓN: Si habla español, tiene a doty disposición servicios gratuitos de asistencia lingüística. Llame al [phone].    We comply with applicable federal civil rights laws and [place] laws. We do not discriminate on the basis of race, color, national origin, age, disability, sex, sexual orientation, or gender identity.            Thank you!     Thank you for choosing [facility]  for your care. Our goal is always to provide you with excellent care. Hearing back from our patients is one way we can continue to improve our services. Please take a few minutes to complete the written survey that you may receive in the mail after your visit with us. Thank you!             Your Updated Medication List - Protect others around you: Learn how to safely use, store and throw away your medicines at www.disposemymeds.org.          This list is accurate as of: 12/18/17  1:44 PM.  Always  use your most recent med list.                   Brand Name Dispense Instructions for use Diagnosis    CLARITIN-D 12 HOUR PO      1 tablets as needed        loperamide 2 MG tablet    IMODIUM A-D    20 tablet    Take two tablets now then one tab with every loose stool until diarrhea stops.    Diarrhea       metFORMIN 750 MG 24 hr tablet    GLUCOPHAGE-XR    30 tablet    Take 1 tablet (750 mg) by mouth daily (with dinner)    Irregular menstrual cycle       PARoxetine 30 MG tablet    PAXIL    90 tablet    Take 1 tablet (30 mg) by mouth At Bedtime    Major depressive disorder, recurrent episode, mild (H)       PRENATAL PO           TYLENOL 500 MG tablet   Generic drug:  acetaminophen      Take 500-1,000 mg by mouth every 6 hours as needed historical

## 2017-12-18 NOTE — NURSING NOTE
"Chief Complaint   Patient presents with     RECHECK     FU medication--  Metformin       Initial /90  Pulse 98  Wt 264 lb (119.7 kg)  LMP 12/10/2017  BMI 42.61 kg/m2 Estimated body mass index is 42.61 kg/(m^2) as calculated from the following:    Height as of 5/30/17: 5' 6\" (1.676 m).    Weight as of this encounter: 264 lb (119.7 kg).  Medication Reconciliation: complete  "

## 2017-12-18 NOTE — PROGRESS NOTES
Subjective  32 year old non-pregnant female presents today as a medication follow up.  Patient was placed on Metformin by me 2 months ago.  She now has regular cycles and feels she has lost some weight.  She has also changed her diet.  Her last menstrual period was 12/10.  Her cycles were very irregular prior to this.  She had a normal cycle in November as well.  She bled for 7 days this month.  Patient has been doing ovulation kits and has been getting positive kits.  She is only doing the kits.  No BBT.  In Oct her weight was 276lbs and now it is 264lbs.  We discussed exercise as well.  I recommended she continue with the Metformin.  She is due to see FP soon for depression follow up and blood pressure check.  We discussed continuing to do OPKs and timing of intercourse.         ROS: 10 point ROS neg other than the symptoms noted above in the HPI.  Past Medical History:   Diagnosis Date     Allergy      TREY (obstructive sleep apnea) 11/3/2014    AHI 10.1 Oxygen Abraham 79%     Past Surgical History:   Procedure Laterality Date     DENTAL SURGERY      wisdom teeth removed.      Family History   Problem Relation Age of Onset     Lipids Mother      TREY     Hypertension Mother      DIABETES Mother      Obesity Mother      Family History Negative Father      snores     GASTROINTESTINAL DISEASE Father      Depression Father      Breast Cancer Maternal Grandmother      Social History   Substance Use Topics     Smoking status: Never Smoker     Smokeless tobacco: Never Used     Alcohol use 0.6 oz/week     1 Standard drinks or equivalent per week      Comment: 1 monthly         Objective  Vitals: /90  Pulse 98  Wt 264 lb (119.7 kg)  LMP 12/10/2017  BMI 42.61 kg/m2  BMI= Body mass index is 42.61 kg/(m^2).        Assessment  1.)  PCOS  2.)  Depression  3.)  HTN      Plan  1.)  Metformin reordered  2.)  Follow up with FP      25 minutes was spent face to face with the patient today discussing her history, diagnosis,  and follow-up plan as noted above.  Over 50% of the visit was spent in counseling and coordination of care.        Nursing notes read and reviewed    Nancy Kaplan

## 2018-01-04 ENCOUNTER — VIRTUAL VISIT (OUTPATIENT)
Dept: FAMILY MEDICINE | Facility: OTHER | Age: 33
End: 2018-01-04

## 2018-01-04 NOTE — PROGRESS NOTES
"Date:   Clinician: Chen Ennis  Clinician NPI: [id number]  Patient: Maria T Armando  Patient : 1985  Patient Address: [address] [place] [address]  Patient Phone: [phone]  Visit Protocol: UTI  Patient Summary:  Maria T is a 32 year old ( : 1985 ) female who initiated a Visit for a presumed bladder infection. When asked the question \"Please sign me up to receive news, health information and promotions from [facility].\", Maria T responded \"No\".    Her symptoms began 2 days ago and consist of dysuria, urinary incontinence, urinary frequency, foul smelling urine, and urgency.   Symptom Details   Urinary Frequency: Every 2-3 hours    She denies abdominal pain, vomiting, hematuria, loss of appetite, chills, vaginal discharge, flank pain, nausea, recent antibiotic use, hesitation, and feeling feverish. Maria T has never had kidney stones. She has not been hospitalized, been a patient in a nursing home, or had a catheter in the past two weeks. She denies risk factors for sexually transmitted infections.   Maria T has had one (1) UTI in the past 12 months. Her most recent bladder infection was not within the last 4 weeks. Her current symptoms are similar to the previous UTI symptoms. She took nitrofurantoin for her last infection and found it to be effective.   Maria T does not get yeast infections when she takes antibiotics.   She denies pregnancy and denies breastfeeding. She has menstruated in the past month.   She does NOT smoke or use smokeless tobacco.   Additional information as reported by the patient (free text): Note: I take metformin for PCOS to promote ovulation.  I do not have diabetes.    MEDICATIONS:  Paroxetine (Paxil, Pexeva), metformin (Glucophage), and acetaminophen (Tylenol)  , ALLERGIES:  NKDA   Clinician Response:  Dear Maria T,  Based on the information you have provided, you likely have a bladder infection, also called an acute urinary tract " infection (UTI).   To treat your infection, I am prescribing:   Bactrim DS. Swallow one (1) tablet twice a day for 3 days to treat your bladder infection. Continue taking the tablets even if you feel better before all the medication is gone. There is no refill with this prescription.   Antibiotic selections by the provider are based on safety and effectiveness. You may or may not be prescribed the same medication that you took for your last bladder infection.   Some people develop allergies to antibiotics. If you notice a new rash, significant swelling, or difficulty breathing, stop the medication immediately and go into a clinic for physical evaluation.   To help treat your current UTI and prevent future occurrences, remember to:     Drink 8-10, 8-ounce glasses of water daily.    Urinate after sexual intercourse.    Wipe front to back after using the bathroom.     Some women may develop a yeast infection as a side effect of taking antibiotics. If you notice symptoms of a yeast infection, [facility] can help treat that condition as well. Simply log in and complete another Visit, which will cover all of the necessary questions to determine the best treatment for you.   You should visit a clinic for a follow-up visit if your symptoms do not improve in 1-2 days or if you experience another urinary tract infection soon after completing this treatment.  If you become pregnant during this course of treatment, stop taking the medication and contact your primary care provider.   Diagnosis: Acute Uncomplicated Bladder Infection  Diagnosis ICD: N39.0  Prescription: sulfamethoxazole-TMP DS (Bactrim DS) 800-160mg oral tablet 6 tablets, 3 days supply. Take one tablet by mouth two times a day for 3 days. Refills: 0, Refill as needed: no, Allow substitutions: yes  Pharmacy: [facility]/pharmacy #[id number] - [phone] - [address] [place] [address]

## 2018-02-01 ENCOUNTER — OFFICE VISIT (OUTPATIENT)
Dept: FAMILY MEDICINE | Facility: CLINIC | Age: 33
End: 2018-02-01
Payer: COMMERCIAL

## 2018-02-01 VITALS
TEMPERATURE: 97.8 F | BODY MASS INDEX: 41.87 KG/M2 | HEIGHT: 66 IN | HEART RATE: 100 BPM | OXYGEN SATURATION: 96 % | RESPIRATION RATE: 16 BRPM | SYSTOLIC BLOOD PRESSURE: 122 MMHG | WEIGHT: 260.5 LBS | DIASTOLIC BLOOD PRESSURE: 84 MMHG

## 2018-02-01 DIAGNOSIS — F33.0 MAJOR DEPRESSIVE DISORDER, RECURRENT EPISODE, MILD (H): Primary | ICD-10-CM

## 2018-02-01 PROCEDURE — 99214 OFFICE O/P EST MOD 30 MIN: CPT | Performed by: FAMILY MEDICINE

## 2018-02-01 RX ORDER — PAROXETINE 30 MG/1
30 TABLET, FILM COATED ORAL AT BEDTIME
Qty: 90 TABLET | Refills: 4 | Status: SHIPPED | OUTPATIENT
Start: 2018-02-01 | End: 2019-02-21

## 2018-02-01 ASSESSMENT — ANXIETY QUESTIONNAIRES
GAD7 TOTAL SCORE: 5
5. BEING SO RESTLESS THAT IT IS HARD TO SIT STILL: SEVERAL DAYS
IF YOU CHECKED OFF ANY PROBLEMS ON THIS QUESTIONNAIRE, HOW DIFFICULT HAVE THESE PROBLEMS MADE IT FOR YOU TO DO YOUR WORK, TAKE CARE OF THINGS AT HOME, OR GET ALONG WITH OTHER PEOPLE: SOMEWHAT DIFFICULT
6. BECOMING EASILY ANNOYED OR IRRITABLE: SEVERAL DAYS
7. FEELING AFRAID AS IF SOMETHING AWFUL MIGHT HAPPEN: NOT AT ALL
3. WORRYING TOO MUCH ABOUT DIFFERENT THINGS: SEVERAL DAYS
1. FEELING NERVOUS, ANXIOUS, OR ON EDGE: SEVERAL DAYS
2. NOT BEING ABLE TO STOP OR CONTROL WORRYING: SEVERAL DAYS

## 2018-02-01 ASSESSMENT — PAIN SCALES - GENERAL: PAINLEVEL: NO PAIN (0)

## 2018-02-01 ASSESSMENT — PATIENT HEALTH QUESTIONNAIRE - PHQ9: 5. POOR APPETITE OR OVEREATING: NOT AT ALL

## 2018-02-01 NOTE — PROGRESS NOTES
SUBJECTIVE:   Maria T Armando is a 32 year old female who presents to clinic today for the following health issues:      Depression and Anxiety Follow-Up    Status since last visit: Improved - both    Other associated symptoms:None    Complicating factors:     Significant life event: Yes-  Starting new med for PCOS (new diagnosis)     Current substance abuse: None    PHQ-9 5/5/2016 12/1/2016 5/30/2017   Total Score 0 9 5   Q9: Suicide Ideation Not at all Not at all Not at all     TIFFANIE-7 SCORE 8/25/2014 12/1/2016 5/30/2017   Total Score 3 - -   Total Score - 5 4       PHQ-9  English  PHQ-9   Any Language  TIFFANIE-7  Suicide Assessment Five-step Evaluation and Treatment (SAFE-T)    Amount of exercise or physical activity: 6-7 days/week for an average of greater than 60 minutes    Problems taking medications regularly: No    Medication side effects: diarrhea (minor) with the metformin    Diet: diabetic, carbohydrate counting and gluten-free/reduced            Problem list and histories reviewed & adjusted, as indicated.  Additional history: as documented    Current Outpatient Prescriptions   Medication Sig Dispense Refill     metFORMIN (GLUCOPHAGE-XR) 750 MG 24 hr tablet Take 1 tablet (750 mg) by mouth daily (with dinner) 30 tablet 3     Prenatal Vit-Fe Fumarate-FA (PRENATAL PO)        PARoxetine (PAXIL) 30 MG tablet Take 1 tablet (30 mg) by mouth At Bedtime 90 tablet 4     loperamide (IMODIUM A-D) 2 MG tablet Take two tablets now then one tab with every loose stool until diarrhea stops. 20 tablet 1     acetaminophen (TYLENOL) 500 MG tablet Take 500-1,000 mg by mouth every 6 hours as needed historical         Reviewed and updated as needed this visit by clinical staff       Reviewed and updated as needed this visit by Provider         ROS:  Constitutional, HEENT, cardiovascular, pulmonary, gi and gu systems are negative, except as otherwise noted.    OBJECTIVE:     /84 (BP Location: Left arm, Patient Position:  "Chair, Cuff Size: Adult Large)  Pulse 100  Temp 97.8  F (36.6  C) (Tympanic)  Resp 16  Ht 5' 5.8\" (1.671 m)  Wt 260 lb 8 oz (118.2 kg)  LMP 01/19/2018 (Exact Date)  SpO2 96%  BMI 42.3 kg/m2  Body mass index is 42.3 kg/(m^2).   GENERAL: healthy, alert and no distress  RESP: lungs clear to auscultation - no rales, rhonchi or wheezes  CV: regular rate and rhythm, normal S1 S2, no S3 or S4, no murmur, click or rub, no peripheral edema and peripheral pulses strong    Diagnostic Test Results:  none     ASSESSMENT:       PLAN:   1. Major depressive disorder, recurrent episode, mild (H)  Markedly improved   - DEPRESSION ACTION PLAN (DAP)  - PARoxetine (PAXIL) 30 MG tablet; Take 1 tablet (30 mg) by mouth At Bedtime  Dispense: 90 tablet; Refill: 4      I spent 25 minutes with this patient over 50% of the time was in healthcare counseling, careplan development, strategies for partnering in keeping this patient healthy and appropriate referrals and follow up    Work on weight loss  Regular exercise    Fernando Quigley MD, MD  [facility]    "

## 2018-02-01 NOTE — NURSING NOTE
"Chief Complaint   Patient presents with     Depression     follow up     Anxiety     follow up       Initial /84 (BP Location: Left arm, Patient Position: Chair, Cuff Size: Adult Large)  Pulse 100  Temp 97.8  F (36.6  C) (Tympanic)  Resp 16  Ht 5' 5.8\" (1.671 m)  Wt 260 lb 8 oz (118.2 kg)  LMP 01/19/2018 (Exact Date)  SpO2 96%  BMI 42.3 kg/m2 Estimated body mass index is 42.3 kg/(m^2) as calculated from the following:    Height as of this encounter: 5' 5.8\" (1.671 m).    Weight as of this encounter: 260 lb 8 oz (118.2 kg).  Medication Reconciliation: complete   Health Maintenance Due   Topic Date Due     INFLUENZA VACCINE (SYSTEM ASSIGNED)  09/01/2017     PHQ-9 Q6 MONTHS  11/30/2017     DEPRESSION ACTION PLAN Q1 YR  12/01/2017     Lilia Strong, [facility]      "

## 2018-02-01 NOTE — MR AVS SNAPSHOT
"              After Visit Summary   2/1/2018    Maria T Armando    MRN: [id number]           Patient Information     Date Of Birth          1985        Visit Information        Provider Department      2/1/2018 10:40 AM Fernando Quigley MD [facility]        Today's Diagnoses     Major depressive disorder, recurrent episode, mild (H)    -  1       Follow-ups after your visit        Who to contact     If you have questions or need follow up information about today's clinic visit or your schedule please contact [facility] directly at [phone].  Normal or non-critical lab and imaging results will be communicated to you by [employer], letter or phone within 4 business days after the clinic has received the results. If you do not hear from us within 7 days, please contact the clinic through [employer] or phone. If you have a critical or abnormal lab result, we will notify you by phone as soon as possible.  Submit refill requests through [employer] or call your pharmacy and they will forward the refill request to us. Please allow 3 business days for your refill to be completed.          Additional Information About Your Visit        MyChart Information     [employer] gives you secure access to your electronic health record. If you see a primary care provider, you can also send messages to your care team and make appointments. If you have questions, please call your primary care clinic.  If you do not have a primary care provider, please call [phone] and they will assist you.        Care EveryWhere ID     This is your Care EveryWhere ID. This could be used by other organizations to access your [facility] medical records  [id number]        Your Vitals Were     Pulse Temperature Respirations Height Last Period Pulse Oximetry    100 97.8  F (36.6  C) (Tympanic) 16 5' 5.8\" (1.671 m) 01/19/2018 (Exact Date) 96%    BMI (Body Mass Index)                   42.3 kg/m2            Blood Pressure " from Last 3 Encounters:   02/01/18 122/84   12/18/17 138/82   10/10/17 132/88    Weight from Last 3 Encounters:   02/01/18 260 lb 8 oz (118.2 kg)   12/18/17 264 lb (119.7 kg)   10/10/17 276 lb (125.2 kg)              We Performed the Following     DEPRESSION ACTION PLAN (DAP)          Where to get your medicines      These medications were sent to [facility]/pharmacy #[id number] - [place] - [address]  [address], [place] [address]     Phone:  [phone]     PARoxetine 30 MG tablet          Primary Care Provider Office Phone # Fax #    Fernando Quigley [phone]       [address] [place] [address]        Equal Access to Services     JUANIS CERDA : Hadii aad ku hadasho Soomaali, waaxda luqadaha, qaybta kaalmada adeegyada, akila rubin . So [facility] [place] [facility] [phone].    ATENCIÓN: Si habla español, tiene a doty disposición servicios gratuitos de asistencia lingüística. Llame al [phone].    We comply with applicable federal civil rights laws and [place] laws. We do not discriminate on the basis of race, color, national origin, age, disability, sex, sexual orientation, or gender identity.            Thank you!     Thank you for choosing [facility]  for your care. Our goal is always to provide you with excellent care. Hearing back from our patients is one way we can continue to improve our services. Please take a few minutes to complete the written survey that you may receive in the mail after your visit with us. Thank you!             Your Updated Medication List - Protect others around you: Learn how to safely use, store and throw away your medicines at www.disposemymeds.org.          This list is accurate as of 2/1/18  1:39 PM.  Always use your most recent med list.                   Brand Name Dispense Instructions for use Diagnosis    loperamide 2 MG tablet    IMODIUM A-D    20 tablet    Take two tablets now then one tab with every loose stool  until diarrhea stops.    Diarrhea       metFORMIN 750 MG 24 hr tablet    GLUCOPHAGE-XR    30 tablet    Take 1 tablet (750 mg) by mouth daily (with dinner)    Irregular menstrual cycle       PARoxetine 30 MG tablet    PAXIL    90 tablet    Take 1 tablet (30 mg) by mouth At Bedtime    Major depressive disorder, recurrent episode, mild (H)       PRENATAL PO           TYLENOL 500 MG tablet   Generic drug:  acetaminophen      Take 500-1,000 mg by mouth every 6 hours as needed historical

## 2018-02-01 NOTE — LETTER
My Depression Action Plan  Name: Maria T Armando   Date of Birth 1985  Date: 2/1/2018    My doctor: Fernando Qiugley   My clinic: [address] [address] [phone]          GREEN    ZONE   Good Control    What it looks like:     Things are going generally well. You have normal up s and down s. You may even feel depressed from time to time, but bad moods usually last less than a day.   What you need to do:  1. Continue to care for yourself (see self care plan)  2. Check your depression survival kit and update it as needed  3. Follow your physician s recommendations including any medication.  4. Do not stop taking medication unless you consult with your physician first.           YELLOW         ZONE Getting Worse    What it looks like:     Depression is starting to interfere with your life.     It may be hard to get out of bed; you may be starting to isolate yourself from others.    Symptoms of depression are starting to last most all day and this has happened for several days.     You may have suicidal thoughts but they are not constant.   What you need to do:     1. Call your care team, your response to treatment will improve if you keep your care team informed of your progress. Yellow periods are signs an adjustment may need to be made.     2. Continue your self-care, even if you have to fake it!    3. Talk to someone in your support network    4. Open up your depression survival kit           RED    ZONE Medical Alert - Get Help    What it looks like:     Depression is seriously interfering with your life.     You may experience these or other symptoms: You can t get out of bed most days, can t work or engage in other necessary activities, you have trouble taking care of basic hygiene, or basic responsibilities, thoughts of suicide or death that will not go away, self-injurious behavior.     What you need to do:  1. Call your care team and  request a same-day appointment. If they are not available (weekends or after hours) call your local crisis line, emergency room or 911.      Electronically signed by: Radha Strong, February 1, 2018    Depression Self Care Plan / Survival Kit    Self-Care for Depression  Here s the deal. Your body and mind are really not as separate as most people think.  What you do and think affects how you feel and how you feel influences what you do and think. This means if you do things that people who feel good do, it will help you feel better.  Sometimes this is all it takes.  There is also a place for medication and therapy depending on how severe your depression is, so be sure to consult with your medical provider and/ or Behavioral Health Consultant if your symptoms are worsening or not improving.     In order to better manage my stress, I will:    Exercise  Get some form of exercise, every day. This will help reduce pain and release endorphins, the  feel good  chemicals in your brain. This is almost as good as taking antidepressants!  This is not the same as joining a gym and then never going! (they count on that by the way ) It can be as simple as just going for a walk or doing some gardening, anything that will get you moving.      Hygiene   Maintain good hygiene (Get out of bed in the morning, Make your bed, Brush your teeth, Take a shower, and Get dressed like you were going to work, even if you are unemployed).  If your clothes don't fit try to get ones that do.    Diet  I will strive to eat foods that are good for me, drink plenty of water, and avoid excessive sugar, caffeine, alcohol, and other mood-altering substances.  Some foods that are helpful in depression are: complex carbohydrates, B vitamins, flaxseed, fish or fish oil, fresh fruits and vegetables.    Psychotherapy  I agree to participate in Individual Therapy (if recommended).    Medication  If prescribed medications, I agree to take them.  Missing doses  can result in serious side effects.  I understand that drinking alcohol, or other illicit drug use, may cause potential side effects.  I will not stop my medication abruptly without first discussing it with my provider.    Staying Connected With Others  I will stay in touch with my friends, family members, and my primary care provider/team.    Use your imagination  Be creative.  We all have a creative side; it doesn t matter if it s oil painting, sand castles, or mud pies! This will also kick up the endorphins.    Witness Beauty  (AKA stop and smell the roses) Take a look outside, even in mid-winter. Notice colors, textures. Watch the squirrels and birds.     Service to others  Be of service to others.  There is always someone else in need.  By helping others we can  get out of ourselves  and remember the really important things.  This also provides opportunities for practicing all the other parts of the program.    Humor  Laugh and be silly!  Adjust your TV habits for less news and crime-drama and more comedy.    Control your stress  Try breathing deep, massage therapy, biofeedback, and meditation. Find time to relax each day.     My support system    Clinic Contact:  Phone number:    Contact 1:  Phone number:    Contact 2:  Phone number:    Scientologist/:  Phone number:    Therapist:  Phone number:    Local crisis center:    Phone number:    Other community support:  Phone number:

## 2018-02-02 ASSESSMENT — PATIENT HEALTH QUESTIONNAIRE - PHQ9: SUM OF ALL RESPONSES TO PHQ QUESTIONS 1-9: 3

## 2018-02-02 ASSESSMENT — ANXIETY QUESTIONNAIRES: GAD7 TOTAL SCORE: 5

## 2018-02-21 ENCOUNTER — OFFICE VISIT (OUTPATIENT)
Dept: URGENT CARE | Facility: URGENT CARE | Age: 33
End: 2018-02-21
Payer: COMMERCIAL

## 2018-02-21 VITALS — TEMPERATURE: 98.6 F | HEART RATE: 101 BPM | WEIGHT: 260.4 LBS | BODY MASS INDEX: 42.29 KG/M2 | OXYGEN SATURATION: 98 %

## 2018-02-21 DIAGNOSIS — R05.9 COUGH: Primary | ICD-10-CM

## 2018-02-21 DIAGNOSIS — J06.9 VIRAL UPPER RESPIRATORY TRACT INFECTION: ICD-10-CM

## 2018-02-21 LAB
FLUAV+FLUBV AG SPEC QL: NEGATIVE
FLUAV+FLUBV AG SPEC QL: NEGATIVE
SPECIMEN SOURCE: NORMAL

## 2018-02-21 PROCEDURE — 87804 INFLUENZA ASSAY W/OPTIC: CPT | Mod: 59 | Performed by: NURSE PRACTITIONER

## 2018-02-21 PROCEDURE — 99213 OFFICE O/P EST LOW 20 MIN: CPT | Performed by: NURSE PRACTITIONER

## 2018-02-21 RX ORDER — BENZONATATE 200 MG/1
200 CAPSULE ORAL 3 TIMES DAILY PRN
Qty: 21 CAPSULE | Refills: 0 | Status: SHIPPED | OUTPATIENT
Start: 2018-02-21 | End: 2018-02-28

## 2018-02-21 RX ORDER — FLUTICASONE PROPIONATE 50 MCG
1-2 SPRAY, SUSPENSION (ML) NASAL DAILY
Qty: 1 BOTTLE | Refills: 0 | Status: SHIPPED | OUTPATIENT
Start: 2018-02-21 | End: 2018-02-28

## 2018-02-21 ASSESSMENT — ENCOUNTER SYMPTOMS
SORE THROAT: 0
HEADACHES: 0
SHORTNESS OF BREATH: 0
WHEEZING: 0
SINUS PAIN: 0
COUGH: 1
MYALGIAS: 0
DIARRHEA: 0
CHILLS: 0
SPUTUM PRODUCTION: 0
FEVER: 0
DIAPHORESIS: 0
NAUSEA: 0
VOMITING: 0

## 2018-02-21 NOTE — NURSING NOTE
"Chief Complaint   Patient presents with     Cough     Patient complains of cough and congestion       Initial Pulse 101  Temp 98.6  F (37  C) (Oral)  Wt 260 lb 6.4 oz (118.1 kg)  SpO2 98%  BMI 42.29 kg/m2 Estimated body mass index is 42.29 kg/(m^2) as calculated from the following:    Height as of 2/1/18: 5' 5.8\" (1.671 m).    Weight as of this encounter: 260 lb 6.4 oz (118.1 kg).  Medication Reconciliation: complete       Monalisa Medrano    "

## 2018-02-21 NOTE — PROGRESS NOTES
SUBJECTIVE:   Maria T Armando is a 32 year old female presenting with a chief complaint of   Chief Complaint   Patient presents with     Cough     Patient complains of cough and congestion   .    Onset of symptoms was 1 day(s) ago.  Course of illness is worsening.    Severity moderate  Current and Associated symptoms: chest and nasal congestion, cough, runny nose.  Treatment measures tried include claritin D.  Predisposing factors include None.      Review of Systems   Constitutional: Negative for chills, diaphoresis, fever and malaise/fatigue.   HENT: Positive for congestion. Negative for ear discharge, ear pain, sinus pain and sore throat.    Respiratory: Positive for cough. Negative for sputum production, shortness of breath and wheezing.         Chest tight   Gastrointestinal: Negative for diarrhea, nausea and vomiting.   Musculoskeletal: Negative for myalgias.   Skin: Negative for rash.   Neurological: Negative for headaches.         Past Medical History:   Diagnosis Date     Allergy      TREY (obstructive sleep apnea) 11/3/2014    AHI 10.1 Oxygen Abraham 79%     Current Outpatient Prescriptions   Medication Sig Dispense Refill     fluticasone (FLONASE) 50 MCG/ACT spray Spray 1-2 sprays into both nostrils daily for 7 days 1 Bottle 0     benzonatate (TESSALON) 200 MG capsule Take 1 capsule (200 mg) by mouth 3 times daily as needed for cough 21 capsule 0     PARoxetine (PAXIL) 30 MG tablet Take 1 tablet (30 mg) by mouth At Bedtime 90 tablet 4     metFORMIN (GLUCOPHAGE-XR) 750 MG 24 hr tablet Take 1 tablet (750 mg) by mouth daily (with dinner) 30 tablet 3     Prenatal Vit-Fe Fumarate-FA (PRENATAL PO)        loperamide (IMODIUM A-D) 2 MG tablet Take two tablets now then one tab with every loose stool until diarrhea stops. 20 tablet 1     acetaminophen (TYLENOL) 500 MG tablet Take 500-1,000 mg by mouth every 6 hours as needed historical       Social History   Substance Use Topics     Smoking status: Never Smoker      Smokeless tobacco: Never Used     Alcohol use 0.6 oz/week     1 Standard drinks or equivalent per week      Comment: 1 monthly       OBJECTIVE  Pulse 101  Temp 98.6  F (37  C) (Oral)  Wt 260 lb 6.4 oz (118.1 kg)  SpO2 98%  BMI 42.29 kg/m2    Physical Exam   Constitutional: She is well-developed, well-nourished, and in no distress. No distress.   HENT:   Head: Normocephalic and atraumatic.   Right Ear: Tympanic membrane, external ear and ear canal normal.   Left Ear: Tympanic membrane, external ear and ear canal normal.   Nose: Mucosal edema (And congestion.) and rhinorrhea (Clear rhinorrhea) present.   Mouth/Throat: Oropharynx is clear and moist. Mucous membranes are not dry. No oropharyngeal exudate, posterior oropharyngeal erythema or tonsillar abscesses.   Eyes: EOM are normal. Right eye exhibits no discharge. Left eye exhibits no discharge.   Neck: Normal range of motion. Neck supple.   Cardiovascular: Normal rate, regular rhythm and normal heart sounds.    Pulmonary/Chest: Effort normal and breath sounds normal. No respiratory distress. She has no wheezes. She has no rales.   Musculoskeletal: Normal range of motion. She exhibits no edema.   Lymphadenopathy:     She has no cervical adenopathy.   Neurological: She is alert. No cranial nerve deficit. Gait normal.   Skin: Skin is warm and dry. No rash noted. She is not diaphoretic.   Psychiatric: Mood and affect normal.       Labs:  Results for orders placed or performed in visit on 02/21/18 (from the past 24 hour(s))   Influenza A/B antigen   Result Value Ref Range    Influenza A/B Agn Specimen Nasal     Influenza A Negative NEG^Negative    Influenza B Negative NEG^Negative       ASSESSMENT:      ICD-10-CM    1. Cough R05 Influenza A/B antigen     benzonatate (TESSALON) 200 MG capsule   2. Viral upper respiratory tract infection J06.9 fluticasone (FLONASE) 50 MCG/ACT spray    B97.89             Differential Diagnosis:  URI Adult/Peds:  Asthma, Pneumonia and  Sinusitis    Serious Comorbid Conditions:  Adult:  None    PLAN:  I discussed lab results with the patient.  Likely a viral respiratory infection.  Advised increased fluid intake and rest.      Followup:    If not improving or if condition worsens, follow up with your Primary Care Provider    Patient Instructions     Viral Upper Respiratory Illness (Adult)  You have a viral upper respiratory illness (URI), which is another term for the common cold. This illness is contagious during the first few days. It is spread through the air by coughing and sneezing. It may also be spread by direct contact (touching the sick person and then touching your own eyes, nose, or mouth). Frequent handwashing will decrease risk of spread. Most viral illnesses go away within 7 to 10 days with rest and simple home remedies. Sometimes the illness may last for several weeks. Antibiotics will not kill a virus, and they are generally not prescribed for this condition.    Home care    If symptoms are severe, rest at home for the first 2 to 3 days. When you resume activity, don't let yourself get too tired.    Avoid being exposed to cigarette smoke (yours or others ).    You may use acetaminophen or ibuprofen to control pain and fever, unless another medicine was prescribed. (Note: If you have chronic liver or kidney disease, have ever had a stomach ulcer or gastrointestinal bleeding, or are taking blood-thinning medicines, talk with your healthcare provider before using these medicines.) Aspirin should never be given to anyone under 18 years of age who is ill with a viral infection or fever. It may cause severe liver or brain damage.    Your appetite may be poor, so a light diet is fine. Avoid dehydration by drinking 6 to 8 glasses of fluids per day (water, soft drinks, juices, tea, or soup). Extra fluids will help loosen secretions in the nose and lungs.    Over-the-counter cold medicines will not shorten the length of time you re sick, but  they may be helpful for the following symptoms: cough, sore throat, and nasal and sinus congestion. (Note: Do not use decongestants if you have high blood pressure.)  Follow-up care  Follow up with your healthcare provider, or as advised.  When to seek medical advice  Call your healthcare provider right away if any of these occur:    Cough with lots of colored sputum (mucus)    Severe headache; face, neck, or ear pain    Difficulty swallowing due to throat pain    Fever of 100.4 F (38 C)  Call 911, or get immediate medical care  Call emergency services right away if any of these occur:    Chest pain, shortness of breath, wheezing, or difficulty breathing    Coughing up blood    Inability to swallow due to throat pain  Date Last Reviewed: 9/13/2015 2000-2017 [employer]. [address] [place] [address], [place] [address]. All rights reserved. This information is not intended as a substitute for professional medical care. Always follow your healthcare professional's instructions.

## 2018-02-21 NOTE — MR AVS SNAPSHOT
After Visit Summary   2/21/2018    Maria T Armando    MRN: [id number]           Patient Information     Date Of Birth          1985        Visit Information        Provider Department      2/21/2018 11:05 AM Ni Mora NP [facility]        Today's Diagnoses     Cough    -  1    Viral upper respiratory tract infection          Care Instructions      Viral Upper Respiratory Illness (Adult)  You have a viral upper respiratory illness (URI), which is another term for the common cold. This illness is contagious during the first few days. It is spread through the air by coughing and sneezing. It may also be spread by direct contact (touching the sick person and then touching your own eyes, nose, or mouth). Frequent handwashing will decrease risk of spread. Most viral illnesses go away within 7 to 10 days with rest and simple home remedies. Sometimes the illness may last for several weeks. Antibiotics will not kill a virus, and they are generally not prescribed for this condition.    Home care    If symptoms are severe, rest at home for the first 2 to 3 days. When you resume activity, don't let yourself get too tired.    Avoid being exposed to cigarette smoke (yours or others ).    You may use acetaminophen or ibuprofen to control pain and fever, unless another medicine was prescribed. (Note: If you have chronic liver or kidney disease, have ever had a stomach ulcer or gastrointestinal bleeding, or are taking blood-thinning medicines, talk with your healthcare provider before using these medicines.) Aspirin should never be given to anyone under 18 years of age who is ill with a viral infection or fever. It may cause severe liver or brain damage.    Your appetite may be poor, so a light diet is fine. Avoid dehydration by drinking 6 to 8 glasses of fluids per day (water, soft drinks, juices, tea, or soup). Extra fluids will help loosen secretions in the nose and  lungs.    Over-the-counter cold medicines will not shorten the length of time you re sick, but they may be helpful for the following symptoms: cough, sore throat, and nasal and sinus congestion. (Note: Do not use decongestants if you have high blood pressure.)  Follow-up care  Follow up with your healthcare provider, or as advised.  When to seek medical advice  Call your healthcare provider right away if any of these occur:    Cough with lots of colored sputum (mucus)    Severe headache; face, neck, or ear pain    Difficulty swallowing due to throat pain    Fever of 100.4 F (38 C)  Call 911, or get immediate medical care  Call emergency services right away if any of these occur:    Chest pain, shortness of breath, wheezing, or difficulty breathing    Coughing up blood    Inability to swallow due to throat pain  Date Last Reviewed: 9/13/2015 2000-2017 [employer]. [address] [place] [address]. All rights reserved. This information is not intended as a substitute for professional medical care. Always follow your healthcare professional's instructions.                Follow-ups after your visit        Who to contact     If you have questions or need follow up information about today's clinic visit or your schedule please contact [facility] directly at [phone].  Normal or non-critical lab and imaging results will be communicated to you by CallVUhart, letter or phone within 4 business days after the clinic has received the results. If you do not hear from us within 7 days, please contact the clinic through MyChart or phone. If you have a critical or abnormal lab result, we will notify you by phone as soon as possible.  Submit refill requests through [place] or call your pharmacy and they will forward the refill request to us. Please allow 3 business days for your refill to be completed.          Additional Information About Your Visit        MyChart Information      Last Guide gives you secure access to your electronic health record. If you see a primary care provider, you can also send messages to your care team and make appointments. If you have questions, please call your primary care clinic.  If you do not have a primary care provider, please call [phone] and they will assist you.        Care EveryWhere ID     This is your Care EveryWhere ID. This could be used by other organizations to access your [place] medical records  [id number]        Your Vitals Were     Pulse Temperature Pulse Oximetry BMI (Body Mass Index)          101 98.6  F (37  C) (Oral) 98% 42.29 kg/m2         Blood Pressure from Last 3 Encounters:   02/01/18 122/84   12/18/17 138/82   10/10/17 132/88    Weight from Last 3 Encounters:   02/21/18 260 lb 6.4 oz (118.1 kg)   02/01/18 260 lb 8 oz (118.2 kg)   12/18/17 264 lb (119.7 kg)              We Performed the Following     Influenza A/B antigen          Today's Medication Changes          These changes are accurate as of 2/21/18 12:07 PM.  If you have any questions, ask your nurse or doctor.               Start taking these medicines.        Dose/Directions    benzonatate 200 MG capsule   Commonly known as:  TESSALON   Used for:  Cough   Started by:  Ni Mora NP        Dose:  200 mg   Take 1 capsule (200 mg) by mouth 3 times daily as needed for cough   Quantity:  21 capsule   Refills:  0       fluticasone 50 MCG/ACT spray   Commonly known as:  FLONASE   Used for:  Viral upper respiratory tract infection   Started by:  Ni Mora NP        Dose:  1-2 spray   Spray 1-2 sprays into both nostrils daily for 7 days   Quantity:  1 Bottle   Refills:  0            Where to get your medicines      These medications were sent to [facility]/pharmacy #[id number] - [place] - [address] [facility]  [address] [place] [address] [place] [address]     Phone:  [phone]     benzonatate 200 MG capsule    fluticasone 50 MCG/ACT spray                Primary Care Provider Office Phone #  Fax #    Fernando Quigley [phone] [address] [place] [address]        Equal Access to Services     JUANIS CERDA : Dahlia aad ku hadjoditaras Brendablair, hersonnatalie haleyrobertoha, caridad kavineet john, akila medina omayranano wiggins laraghuemmanuel cloud. So [facility] [phone].    ATENCIÓN: Si habla español, tiene a doty disposición servicios gratuitos de asistencia lingüística. Llame al [phone].    We comply with applicable federal civil rights laws and [place] laws. We do not discriminate on the basis of race, color, national origin, age, disability, sex, sexual orientation, or gender identity.            Thank you!     Thank you for choosing [facility]  for your care. Our goal is always to provide you with excellent care. Hearing back from our patients is one way we can continue to improve our services. Please take a few minutes to complete the written survey that you may receive in the mail after your visit with us. Thank you!             Your Updated Medication List - Protect others around you: Learn how to safely use, store and throw away your medicines at www.disposemymeds.org.          This list is accurate as of 2/21/18 12:07 PM.  Always use your most recent med list.                   Brand Name Dispense Instructions for use Diagnosis    benzonatate 200 MG capsule    TESSALON    21 capsule    Take 1 capsule (200 mg) by mouth 3 times daily as needed for cough    Cough       fluticasone 50 MCG/ACT spray    FLONASE    1 Bottle    Spray 1-2 sprays into both nostrils daily for 7 days    Viral upper respiratory tract infection       loperamide 2 MG tablet    IMODIUM A-D    20 tablet    Take two tablets now then one tab with every loose stool until diarrhea stops.    Diarrhea       metFORMIN 750 MG 24 hr tablet    GLUCOPHAGE-XR    30 tablet    Take 1 tablet (750 mg) by mouth daily (with dinner)    Irregular menstrual cycle       PARoxetine 30 MG tablet    PAXIL    90 tablet     Take 1 tablet (30 mg) by mouth At Bedtime    Major depressive disorder, recurrent episode, mild (H)       PRENATAL PO           TYLENOL 500 MG tablet   Generic drug:  acetaminophen      Take 500-1,000 mg by mouth every 6 hours as needed historical

## 2018-02-21 NOTE — LETTER
[facility]  [address]  [facility] [address]  Phone: [phone]    February 21, 2018        Maria T Armando  [address]    [facility] [address]          To whom it may concern:    RE: Maria T Armando    Patient was seen and treated today (2/21/2018) at our clinic and missed work.  Patient may return to work  with no restrictions.    Please contact me for questions or concerns.      Sincerely,        Ni Mora NP

## 2018-03-03 DIAGNOSIS — F33.0 MAJOR DEPRESSIVE DISORDER, RECURRENT EPISODE, MILD (H): ICD-10-CM

## 2018-03-05 NOTE — TELEPHONE ENCOUNTER
"Last Written Prescription Date:  2/01/2018  Last Fill Quantity: 90,  # refills: 4   Last office visit: 2/1/2018 with prescribing provider:  Dr. Quigley   Future Office Visit:    Requested Prescriptions   Pending Prescriptions Disp Refills     PARoxetine (PAXIL) 30 MG tablet [Pharmacy Med Name: PAROXETINE HCL 30 MG TABLET] 90 tablet 4     Sig: TAKE 1 TABLET AT BEDTIME    SSRIs Protocol Passed    3/3/2018  1:44 AM       Passed - PHQ-9 score less than 5 in past 6 months    The PHQ-9 criteria is meant to fail. It requires a PHQ-9 score review         Passed - Patient is age 18 or older       Passed - No active pregnancy on record       Passed - No positive pregnancy test in last 12 months       Passed - Recent (6 mo) or future (30 days) visit within the authorizing provider's specialty    Patient had office visit in the last 6 months or has a visit in the next 30 days with authorizing provider.  See \"Patient Info\" tab in inbasket, or \"Choose Columns\" in Meds & Orders section of the refill encounter.              "

## 2018-03-07 RX ORDER — PAROXETINE 30 MG/1
TABLET, FILM COATED ORAL
Qty: 90 TABLET | Refills: 4 | OUTPATIENT
Start: 2018-03-07

## 2018-06-05 DIAGNOSIS — N92.6 IRREGULAR MENSTRUAL CYCLE: ICD-10-CM

## 2018-06-07 NOTE — TELEPHONE ENCOUNTER
"Requested Prescriptions   Pending Prescriptions Disp Refills     metFORMIN (GLUCOPHAGE-XR) 750 MG 24 hr tablet 30 tablet 3     Sig: Take 1 tablet (750 mg) by mouth daily (with dinner)    Biguanide Agents Passed    6/5/2018 12:11 PM       Passed - Blood pressure less than 140/90 in past 6 months    BP Readings from Last 3 Encounters:   02/01/18 122/84   12/18/17 138/82   10/10/17 132/88          Passed - Patient is age 10 or older       Passed - Recent (12 mo) or future (30 days) visit within the authorizing provider's specialty     Patient had office visit in the last 12 months or has a visit in the next 30 days with authorizing provider or within the authorizing provider's specialty.  See \"Patient Info\" tab in inbasket, or \"Choose Columns\" in Meds & Orders section of the refill encounter.           Passed - Patient does NOT have a diagnosis of CHF.       Passed - Patient is not pregnant       Passed - Patient has not had a positive pregnancy test within the past 12 mos.         metFORMIN (GLUCOPHAGE-XR) 750 MG 24 hr tablet  Routing refill request to provider for review/approval because:  A break in medication, should have needed refills 04/2018    Mary Rudolph RN, BSN     "

## 2018-06-11 RX ORDER — METFORMIN HYDROCHLORIDE 750 MG/1
750 TABLET, EXTENDED RELEASE ORAL
Qty: 30 TABLET | Refills: 3 | Status: SHIPPED | OUTPATIENT
Start: 2018-06-11 | End: 2018-10-11

## 2018-06-19 ENCOUNTER — TRANSFERRED RECORDS (OUTPATIENT)
Dept: HEALTH INFORMATION MANAGEMENT | Facility: CLINIC | Age: 33
End: 2018-06-19

## 2018-10-11 DIAGNOSIS — N92.6 IRREGULAR MENSTRUAL CYCLE: ICD-10-CM

## 2018-10-11 RX ORDER — METFORMIN HYDROCHLORIDE 750 MG/1
TABLET, EXTENDED RELEASE ORAL
Qty: 30 TABLET | Refills: 5 | Status: SHIPPED | OUTPATIENT
Start: 2018-10-11 | End: 2019-03-28

## 2018-10-11 NOTE — TELEPHONE ENCOUNTER
"Metformin  Last Written Prescription Date:  06/11/2018  Last Fill Quantity: 30,  # refills: 3   Last office visit: 02/01/2018 with prescribing provider:  Soraida   Future Office Visit:  None  Prescription approved per [facility].    Requested Prescriptions   Pending Prescriptions Disp Refills     metFORMIN (GLUCOPHAGE-XR) 750 MG 24 hr tablet [Pharmacy Med Name: METFORMIN  MG TABLET] 30 tablet 3     Sig: TAKE 1 TABLET (750 MG) BY MOUTH DAILY (WITH DINNER)    Biguanide Agents Failed    10/11/2018  2:20 AM       Failed - Blood pressure less than 140/90 in past 6 months    BP Readings from Last 3 Encounters:   02/01/18 122/84   12/18/17 138/82   10/10/17 132/88          Passed - Patient is age 10 or older       Passed - Recent (12 mo) or future (30 days) visit within the authorizing provider's specialty     Patient had office visit in the last 12 months or has a visit in the next 30 days with authorizing provider or within the authorizing provider's specialty.  See \"Patient Info\" tab in inbasket, or \"Choose Columns\" in Meds & Orders section of the refill encounter.           Passed - Patient does NOT have a diagnosis of CHF.       Passed - Patient is not pregnant       Passed - Patient has not had a positive pregnancy test within the past 12 mos.       Lata Bey RN   "

## 2018-11-27 ENCOUNTER — VIRTUAL VISIT (OUTPATIENT)
Dept: FAMILY MEDICINE | Facility: OTHER | Age: 33
End: 2018-11-27

## 2018-11-27 NOTE — PROGRESS NOTES
"Date:   Clinician: Sallie Kelley  Clinician NPI: [id number]  Patient: Maria T Armando  Patient : 1985  Patient Address: [address], [place] [address]  Patient Phone: [phone]  Visit Protocol: UTI  Patient Summary:  Maria T is a 33 year old ( : 1985 ) female who initiated a Visit for a presumed bladder infection. When asked the question \"Please sign me up to receive news, health information and promotions from [employer].\", Maria T responded \"No\".   Her symptoms started 1-3 days ago and consist of urgency, feeling as if the bladder is never empty, dysuria, urinary incontinence, abdominal pain, and urinary frequency.   Symptom details     Urine color: The color of her urine is yellow.     Abdominal pain: The pain is mild (1-3 on a 10 point pain scale).      Denied symptoms include foul-smelling urine, flank pain, chills, vaginal itching, nausea, vaginal discharge, and vomiting. She does not feel feverish.   Maria T has not used any over-the-counter medications or home remedies to relieve her current symptoms.  Precipitating events  Maria T denies having a sexually transmitted disease.  Pertinent medical history  Maria T has had a bladder infection before and has had 1 in the past 12 months. Her most recent bladder infection was not within the last 4 weeks. Her current symptoms are similar to her previous bladder infection symptoms.   Sulfamethoxazole-trimethoprim (Bactrim DS) and nitrofurantoin (Macrobid) has been effective in treating her past bladder infections.   She has experienced problems or side effects with the following antibiotics in the past: nitrofurantoin (Macrobid).  Problems or side effects as reported by the patient (free text): Macrobid tends to give me nausea and decrease my appetite.    Maria T has not been prescribed antibiotics to prevent frequent or repeated bladder infections in the past and does not get yeast infections when she takes " antibiotics.   Maria T does not have a history of kidney stones. She has not used a catheter or been a patient in a hospital or nursing home in the past 2 weeks.   Maria T does not smoke or use smokeless tobacco.   She denies pregnancy and denies breastfeeding. She has menstruated in the past month.   MEDICATIONS: Prenatal oral, metformin oral, Paxil oral, ALLERGIES: Pertussis Vaccines  Clinician Response:  Dear Maria T,  Based on the information you have provided, you likely have an acute urinary tract infection, also called a bladder infection. Bladder infections occur when bacteria from the outside of the body enters the urinary tract. Any part of the urinary system can be infected, but the bladder is the most common.  Medication information  I am prescribing:     Sulfamethoxazole-trimethoprim (Bactrim DS) 800-160 mg oral tablet. Take 1 tablet by mouth every 12 hours for 3 days. There are no refills with this prescription.   The medication I prescribed for your bladder infection is an antibiotic. Continue taking the medication until it is gone even if you feel better. If you get an upset stomach while taking antibiotics, taking the medication with food can help.   Yeast infections can be a common side effect of antibiotics. The most common symptom of a yeast infection is itchiness in and around the vagina. Other signs and symptoms include burning, redness, or a thick, white vaginal discharge that looks like cottage cheese and does not have a bad smell.  Self care  Urination helps to flush bacteria from the urinary tract. For this reason, drinking water and urinating often helps relieve some symptoms of a bladder infection and can decrease your risk of getting bladder infections in the future.  Other steps you can take to prevent future bladder infections include:     Wipe front to back after using the bathroom    Urinate after sexual intercourse    Avoid using deodorant sprays, douches, or powders in the  vaginal area     When to seek care  Please make an appointment to be seen in a clinic or urgent care if any of the following occur:     You develop new symptoms or your symptoms become worse    You have medication side effects that make it difficult to take them as prescribed    Your symptoms do not improve within 1-2 days of starting treatment    You have symptoms of a bladder infection that return shortly after completing treatment     It is possible to have an allergic reaction to an antibiotic even if you have not had one in the past. If you notice a new rash, significant swelling, or difficulty breathing, stop taking this medication immediately and go to a clinic or urgent care.   Diagnosis: Acute uncomplicated bladder infection  Diagnosis ICD: N39.0  Prescription: sulfamethoxazole-trimethoprim (Bactrim DS) 800-160 mg oral tablet 6 tablet, 3 days supply. Take 1 tablet by mouth every 12 hours for 3 days. Refills: 0, Refill as needed: no, Allow substitutions: yes  Pharmacy: [facility] - [phone] - [address] [place] [address]

## 2019-02-21 DIAGNOSIS — F33.0 MAJOR DEPRESSIVE DISORDER, RECURRENT EPISODE, MILD (H): ICD-10-CM

## 2019-02-22 RX ORDER — PAROXETINE 30 MG/1
30 TABLET, FILM COATED ORAL AT BEDTIME
Qty: 90 TABLET | Refills: 0 | Status: SHIPPED | OUTPATIENT
Start: 2019-02-22 | End: 2019-03-28

## 2019-02-22 NOTE — TELEPHONE ENCOUNTER
Pt calling. She is out. She is hoping this can be sent today.   Thank you,  Maria T Wolff- Pt Rep.

## 2019-02-22 NOTE — TELEPHONE ENCOUNTER
"Routing to PCP for refill if appropriate.   Routing to schedulers for appointment with PCP.     Paxil  Last Written Prescription Date:  02/01/2018  Last Fill Quantity: 90,  # refills: 4   Last office visit: 2/1/2018 with prescribing provider:  Soraida   Future Office Visit:  None  Routing refill request to provider for review/approval because:  Patient needs to be seen because:  Per protocol, patient needs depression follow up every 6 months.   PHQ 9 completed on 02/01/2018.    Requested Prescriptions   Pending Prescriptions Disp Refills     PARoxetine (PAXIL) 30 MG tablet 90 tablet 3     Sig: Take 1 tablet (30 mg) by mouth At Bedtime    SSRIs Protocol Failed - 2/21/2019  4:11 PM       Failed - PHQ-9 score less than 5 in past 6 months    Please review last PHQ-9 score.        Failed - Recent (6 mo) or future (30 days) visit within the authorizing provider's specialty    Patient had office visit in the last 6 months or has a visit in the next 30 days with authorizing provider or within the authorizing provider's specialty.  See \"Patient Info\" tab in inbasket, or \"Choose Columns\" in Meds & Orders section of the refill encounter.         Passed - Medication is active on med list       Passed - Patient is age 18 or older       Passed - No active pregnancy on record       Passed - No positive pregnancy test in last 12 months      PHQ-9 score:    PHQ-9 SCORE 2/1/2018   PHQ-9 Total Score -   PHQ-9 Total Score 3     Lata Bey RN   "

## 2019-03-28 ENCOUNTER — OFFICE VISIT (OUTPATIENT)
Dept: FAMILY MEDICINE | Facility: CLINIC | Age: 34
End: 2019-03-28
Payer: COMMERCIAL

## 2019-03-28 VITALS
HEIGHT: 66 IN | SYSTOLIC BLOOD PRESSURE: 136 MMHG | HEART RATE: 101 BPM | OXYGEN SATURATION: 99 % | WEIGHT: 266 LBS | DIASTOLIC BLOOD PRESSURE: 82 MMHG | RESPIRATION RATE: 16 BRPM | TEMPERATURE: 98.3 F | BODY MASS INDEX: 42.75 KG/M2

## 2019-03-28 DIAGNOSIS — N92.6 IRREGULAR MENSTRUAL CYCLE: ICD-10-CM

## 2019-03-28 DIAGNOSIS — F33.0 MAJOR DEPRESSIVE DISORDER, RECURRENT EPISODE, MILD (H): ICD-10-CM

## 2019-03-28 PROCEDURE — 99214 OFFICE O/P EST MOD 30 MIN: CPT | Performed by: FAMILY MEDICINE

## 2019-03-28 RX ORDER — PAROXETINE 30 MG/1
30 TABLET, FILM COATED ORAL AT BEDTIME
Qty: 90 TABLET | Refills: 3 | Status: SHIPPED | OUTPATIENT
Start: 2019-03-28 | End: 2020-07-07 | Stop reason: DRUGHIGH

## 2019-03-28 RX ORDER — METFORMIN HYDROCHLORIDE 750 MG/1
TABLET, EXTENDED RELEASE ORAL
Qty: 90 TABLET | Refills: 3 | Status: SHIPPED | OUTPATIENT
Start: 2019-03-28 | End: 2020-04-29

## 2019-03-28 ASSESSMENT — ANXIETY QUESTIONNAIRES
1. FEELING NERVOUS, ANXIOUS, OR ON EDGE: NEARLY EVERY DAY
2. NOT BEING ABLE TO STOP OR CONTROL WORRYING: SEVERAL DAYS
5. BEING SO RESTLESS THAT IT IS HARD TO SIT STILL: NOT AT ALL
GAD7 TOTAL SCORE: 7
6. BECOMING EASILY ANNOYED OR IRRITABLE: SEVERAL DAYS
3. WORRYING TOO MUCH ABOUT DIFFERENT THINGS: SEVERAL DAYS
7. FEELING AFRAID AS IF SOMETHING AWFUL MIGHT HAPPEN: SEVERAL DAYS
IF YOU CHECKED OFF ANY PROBLEMS ON THIS QUESTIONNAIRE, HOW DIFFICULT HAVE THESE PROBLEMS MADE IT FOR YOU TO DO YOUR WORK, TAKE CARE OF THINGS AT HOME, OR GET ALONG WITH OTHER PEOPLE: SOMEWHAT DIFFICULT

## 2019-03-28 ASSESSMENT — PAIN SCALES - GENERAL: PAINLEVEL: NO PAIN (0)

## 2019-03-28 ASSESSMENT — PATIENT HEALTH QUESTIONNAIRE - PHQ9
5. POOR APPETITE OR OVEREATING: NOT AT ALL
SUM OF ALL RESPONSES TO PHQ QUESTIONS 1-9: 7

## 2019-03-28 ASSESSMENT — MIFFLIN-ST. JEOR: SCORE: 1931.97

## 2019-03-28 NOTE — PROGRESS NOTES
SUBJECTIVE:   Maria T Armando is a 33 year old female who presents to clinic today for the following health issues:      Depression and Anxiety Follow-Up    Status since last visit: Worsened -both.     Other associated symptoms:None    Complicating factors:     Significant life event: Yes-  Her  has been sick     Current substance abuse: None    PHQ 12/1/2016 5/30/2017 2/1/2018   PHQ-9 Total Score 9 5 3   Q9: Suicide Ideation Not at all Not at all Not at all     TIFFANIE-7 SCORE 12/1/2016 5/30/2017 2/1/2018   Total Score - - -   Total Score 5 4 5       PHQ-9  English  PHQ-9   Any Language  TIFFANIE-7  Suicide Assessment Five-step Evaluation and Treatment (SAFE-T)    Amount of exercise or physical activity: she is active at work    Problems taking medications regularly: No    Medication side effects: paxil- drowsy. Metformin- loose stools    Diet: no dairy, gluten, or soy.             Problem list and histories reviewed & adjusted, as indicated.  Additional history: as documented  Patient is here today has no complaints or concerns states medication is working well for her they brought a new house up on [place] are excited about this are looking forward to spring and summer and getting the place in shape.  No other concerns at this time.  Patient Active Problem List   Diagnosis     Allergic rhinitis     CARDIOVASCULAR SCREENING; LDL GOAL LESS THAN 160     Mild major depression (H)     Anxiety state     Major depressive disorder, recurrent episode, mild (H)     TREY (obstructive sleep apnea)     PCOS (polycystic ovarian syndrome)     Past Surgical History:   Procedure Laterality Date     DENTAL SURGERY      wisdom teeth removed.        Social History     Tobacco Use     Smoking status: Never Smoker     Smokeless tobacco: Never Used   Substance Use Topics     Alcohol use: Yes     Alcohol/week: 0.6 oz     Types: 1 Standard drinks or equivalent per week     Comment: 1 monthly     Family History   Problem Relation Age  "of Onset     Lipids Mother         TREY     Hypertension Mother      Diabetes Mother      Obesity Mother      Family History Negative Father         snores     Gastrointestinal Disease Father      Depression Father      Breast Cancer Maternal Grandmother          Current Outpatient Medications   Medication Sig Dispense Refill     acetaminophen (TYLENOL) 500 MG tablet Take 500-1,000 mg by mouth every 6 hours as needed historical       loperamide (IMODIUM A-D) 2 MG tablet Take two tablets now then one tab with every loose stool until diarrhea stops. 20 tablet 1     metFORMIN (GLUCOPHAGE-XR) 750 MG 24 hr tablet TAKE 1 TABLET (750 MG) BY MOUTH DAILY (WITH DINNER) 90 tablet 3     PARoxetine (PAXIL) 30 MG tablet Take 1 tablet (30 mg) by mouth At Bedtime 90 tablet 3     Prenatal Vit-Fe Fumarate-FA (PRENATAL PO)          Reviewed and updated as needed this visit by clinical staff  Tobacco  Allergies  Meds  Soc Hx      Reviewed and updated as needed this visit by Provider         ROS:  Constitutional, HEENT, cardiovascular, pulmonary, gi and gu systems are negative, except as otherwise noted.    OBJECTIVE:     /82   Pulse 101   Temp 98.3  F (36.8  C) (Tympanic)   Resp 16   Ht 1.666 m (5' 5.6\")   Wt 120.7 kg (266 lb)   LMP 02/25/2019 (Exact Date)   SpO2 99%   BMI 43.46 kg/m    Body mass index is 43.46 kg/m .   GENERAL: healthy, alert and no distress    Diagnostic Test Results:  none     ASSESSMENT:       PLAN:   1. Major depressive disorder, recurrent episode, mild (H)    - PARoxetine (PAXIL) 30 MG tablet; Take 1 tablet (30 mg) by mouth At Bedtime  Dispense: 90 tablet; Refill: 3    2. Irregular menstrual cycle  From O S will continue on the metformin which working well for her.  - metFORMIN (GLUCOPHAGE-XR) 750 MG 24 hr tablet; TAKE 1 TABLET (750 MG) BY MOUTH DAILY (WITH DINNER)  Dispense: 90 tablet; Refill: 3        I spent 25 minutes with this patient over 50% of the time was in healthcare counseling, " careplan development, strategies for partnering in keeping this patient healthy and appropriate referrals and follow up      Fernanod Quigley MD, MD  [facility]

## 2019-03-29 ASSESSMENT — ANXIETY QUESTIONNAIRES: GAD7 TOTAL SCORE: 7

## 2019-05-06 ENCOUNTER — VIRTUAL VISIT (OUTPATIENT)
Dept: FAMILY MEDICINE | Facility: OTHER | Age: 34
End: 2019-05-06

## 2019-05-06 NOTE — PROGRESS NOTES
"Date:   Clinician: Sallie Figueroa  Clinician NPI: [id number]  Patient: Maria T Armando  Patient : 1985  Patient Address: [address] [place] [address] [address] [place] [address]  Patient Phone: [phone]  Visit Protocol: General skin conditions  Patient Summary:  Maria T is a 33 year old ( : 1985 ) female who initiated a Visit for evaluation of an unspecified skin condition. When asked the question \"Please sign me up to receive news, health information and promotions from [employer].\", Maria T responded \"No\".    Images of her skin condition were uploaded.  Her symptoms started 3-4 weeks ago and affect both sides of her body. The skin condition is located on her arms and hands. The skin condition is red in color.   The affected area has dry/flaky skin, scabs, and sores. It feels itchy. The symptoms do not interfere with her sleep.   Symptom details   Redness: The redness has not rapidly increased in size.   The skin condition has changed since the symptoms started. Description of changes as reported by the patient (free text): At first, the rash was just a few itchy bumps on my right forearm. Then an area popped up on my inner left elbow. This has since mostly healed. The area in my right arm has now grown to about a 3\"X1\" area of small red, sandpaper like bumps, with a few bigger red bumps spreading up my right hand.   Denied symptoms include warm to touch, drainage, burning, pain, numbness, pimples, crusts, hives, tender to touch, and blisters. Maria T does not feel feverish. She does not have a rash in the shape of a bull's-eye.   Treatments or home remedies used to relieve the symptoms as reported by the patient (free text): OTC antifungals have helped the most, but are now starting to be ineffective. Hydrocortisone doesn't seem to help. Unscented lotion helps with the itching.   Precipitating events  Just before the symptoms started, Maria T came in contact with a hot object.    Maria T has not " been in close contact with anyone that has similar symptoms. She also did not spend time in a wooded area, swim, travel, or spend excess time in the sun just before her symptoms started. Maria T did not get bitten or stung by an insect.   Pertinent medical history  Maria T has not experienced this skin condition before.   Maria T has not had chickenpox and has not had shingles in the past. She received the varicella vaccine.   Maria T has a history of eczema and seasonal allergies or hay fever. She has ongoing medical conditions. Ongoing medical conditions as reported by the patient (free text): PCOS, depression, anxiety, TREY    Weight: 260 lbs   Maria T does not smoke or use smokeless tobacco.   She denies pregnancy and denies breastfeeding. Her last period was over a month ago.   MEDICATIONS: metformin oral, Prenatal oral, Paxil oral, ALLERGIES: Pertussis Vaccines  Clinician Response:  Dear Maria T,   Based on the information provided, you have contact dermatitis. Contact dermatitis is a condition involving skin inflammation. This occurs after contact with a substance that irritates the skin or causes an allergic skin reaction.   The most common symptom is a red, itchy rash. The skin may also be dry or cracked. Occasionally, blisters develop and form a crust on the surface of the skin after bursting.  Medication information  I am prescribing:     Triamcinolone acetonide 0.1% topical cream. Apply to the affected area(s) 2 times per day. Wash hands before and after use. There are no refills with this prescription.   I am recommending:     Calamine or store brand topical lotion as needed to reduce itching.   Self care  Steps you can take to be as comfortable as possible:     Avoid scratching the rash    Apply a cool, wet washcloth to your rash for 15 minutes several times a day    Take a lukewarm bath to soothe the skin (adding colloidal oatmeal can help even more)    Apply a moisturizing lotion immediately  after bathing and frequently reapply throughout the day    Use mild soap and laundry detergent    Choose clothing and bedding made of a breathable material like cotton    Do not use antibiotic creams or ointments unless recommended by a provider     When to seek care  Please make an appointment to be seen in a clinic or urgent care if any of the following occur:     Symptoms do not improve after 14 days of treatment    New symptoms develop, or symptoms become worse    Symptoms are so severe that you are unable to sleep or do regular activities    You have areas of broken skin from scratching    You notice symptoms of a skin infection (spreading redness, pain that is not improving, fever, warmth)      Diagnosis: Contact dermatitis  Diagnosis ICD: L25.9  Additional Clinician Notes: You may need to be seen in clinic if you have no improvement after 2 days. You may have a secondary superficial bacterial skin infection that may require topical antibiotic.  Prescription: triamcinolone acetonide 0.1 % topical cream 1 454 gram jar, 14 days supply. Apply to the affected area(s) 2 times per day. Refills: 0, Refill as needed: no, Allow substitutions: yes  Pharmacy: [facility] - [phone] - [address] [place] [address]

## 2019-05-22 ASSESSMENT — ENCOUNTER SYMPTOMS
ABDOMINAL PAIN: 1
COUGH: 1
JOINT SWELLING: 0
SHORTNESS OF BREATH: 0
DYSURIA: 0
BREAST MASS: 0
ARTHRALGIAS: 1
SORE THROAT: 1
FREQUENCY: 1
HEMATURIA: 0
PALPITATIONS: 0
MYALGIAS: 1
DIARRHEA: 1
FEVER: 1
WEAKNESS: 1
HEMATOCHEZIA: 0
NAUSEA: 0
HEADACHES: 1
PARESTHESIAS: 0
HEARTBURN: 0

## 2019-05-23 ENCOUNTER — OFFICE VISIT (OUTPATIENT)
Dept: FAMILY MEDICINE | Facility: CLINIC | Age: 34
End: 2019-05-23
Payer: COMMERCIAL

## 2019-05-23 VITALS
RESPIRATION RATE: 20 BRPM | BODY MASS INDEX: 43.95 KG/M2 | DIASTOLIC BLOOD PRESSURE: 88 MMHG | TEMPERATURE: 97.2 F | SYSTOLIC BLOOD PRESSURE: 122 MMHG | HEART RATE: 96 BPM | WEIGHT: 269 LBS | OXYGEN SATURATION: 100 %

## 2019-05-23 DIAGNOSIS — E28.2 PCOS (POLYCYSTIC OVARIAN SYNDROME): ICD-10-CM

## 2019-05-23 DIAGNOSIS — L23.7 CONTACT DERMATITIS DUE TO POISON IVY: ICD-10-CM

## 2019-05-23 DIAGNOSIS — Z01.411 ENCOUNTER FOR GYNECOLOGICAL EXAMINATION WITH ABNORMAL FINDING: Primary | ICD-10-CM

## 2019-05-23 DIAGNOSIS — F33.0 MAJOR DEPRESSIVE DISORDER, RECURRENT EPISODE, MILD (H): ICD-10-CM

## 2019-05-23 PROCEDURE — G0145 SCR C/V CYTO,THINLAYER,RESCR: HCPCS | Performed by: FAMILY MEDICINE

## 2019-05-23 PROCEDURE — 99395 PREV VISIT EST AGE 18-39: CPT | Performed by: FAMILY MEDICINE

## 2019-05-23 PROCEDURE — 87624 HPV HI-RISK TYP POOLED RSLT: CPT | Performed by: FAMILY MEDICINE

## 2019-05-23 RX ORDER — CEPHALEXIN 500 MG/1
500 CAPSULE ORAL 4 TIMES DAILY
Qty: 21 CAPSULE | Refills: 0 | Status: SHIPPED | OUTPATIENT
Start: 2019-05-23 | End: 2019-08-20

## 2019-05-23 RX ORDER — PREDNISONE 20 MG/1
TABLET ORAL
Qty: 15 TABLET | Refills: 0 | Status: SHIPPED | OUTPATIENT
Start: 2019-05-23 | End: 2019-08-20

## 2019-05-23 RX ORDER — TRIAMCINOLONE ACETONIDE 1 MG/G
CREAM TOPICAL
Refills: 0 | COMMUNITY
Start: 2019-05-06 | End: 2019-08-20

## 2019-05-23 ASSESSMENT — PAIN SCALES - GENERAL: PAINLEVEL: NO PAIN (0)

## 2019-05-23 NOTE — PROGRESS NOTES
SUBJECTIVE:   CC: Maria T Armando is an 34 year old woman who presents for preventive health visit.     Healthy Habits:     Getting at least 3 servings of Calcium per day:  Yes    Bi-annual eye exam:  Yes    Dental care twice a year:  Yes    Sleep apnea or symptoms of sleep apnea:  Sleep apnea    Diet:  Carbohydrate counting, Gluten-free/reduced and Other    Frequency of exercise:  1 day/week    Duration of exercise:  45-60 minutes    Taking medications regularly:  Yes    Medication side effects:  Other    PHQ-2 Total Score: 2    Additional concerns today:  Yes                Today's PHQ-2 Score:   PHQ-2 ( 1999 Pfizer) 5/22/2019   Q1: Little interest or pleasure in doing things 1   Q2: Feeling down, depressed or hopeless 1   PHQ-2 Score 2   Q1: Little interest or pleasure in doing things Several days   Q2: Feeling down, depressed or hopeless Several days   PHQ-2 Score 2       Abuse: Current or Past(Physical, Sexual or Emotional)- No  Do you feel safe in your environment? Yes    Social History     Tobacco Use     Smoking status: Never Smoker     Smokeless tobacco: Never Used   Substance Use Topics     Alcohol use: Yes     Alcohol/week: 0.6 oz     Types: 1 Standard drinks or equivalent per week     Comment: 1 monthly     If you drink alcohol do you typically have >3 drinks per day or >7 drinks per week? No    Alcohol Use 5/22/2019   Prescreen: >3 drinks/day or >7 drinks/week? No   Prescreen: >3 drinks/day or >7 drinks/week? -   No flowsheet data found.    Reviewed orders with patient.  Reviewed health maintenance and updated orders accordingly - Yes  BP Readings from Last 3 Encounters:   05/23/19 122/88   03/28/19 136/82   02/01/18 122/84    Wt Readings from Last 3 Encounters:   05/23/19 122 kg (269 lb)   03/28/19 120.7 kg (266 lb)   02/21/18 118.1 kg (260 lb 6.4 oz)                  Patient Active Problem List   Diagnosis     Allergic rhinitis     CARDIOVASCULAR SCREENING; LDL GOAL LESS THAN 160     Mild major  depression (H)     Anxiety state     Major depressive disorder, recurrent episode, mild (H)     TREY (obstructive sleep apnea)     PCOS (polycystic ovarian syndrome)     Past Surgical History:   Procedure Laterality Date     DENTAL SURGERY      wisdom teeth removed.        Social History     Tobacco Use     Smoking status: Never Smoker     Smokeless tobacco: Never Used   Substance Use Topics     Alcohol use: Yes     Alcohol/week: 0.6 oz     Types: 1 Standard drinks or equivalent per week     Comment: 1 monthly     Family History   Problem Relation Age of Onset     Lipids Mother         TREY     Hypertension Mother      Diabetes Mother      Obesity Mother      Family History Negative Father         snores     Gastrointestinal Disease Father      Depression Father      Breast Cancer Maternal Grandmother          Current Outpatient Medications   Medication Sig Dispense Refill     acetaminophen (TYLENOL) 500 MG tablet Take 500-1,000 mg by mouth every 6 hours as needed historical       loperamide (IMODIUM A-D) 2 MG tablet Take two tablets now then one tab with every loose stool until diarrhea stops. 20 tablet 1     metFORMIN (GLUCOPHAGE-XR) 750 MG 24 hr tablet TAKE 1 TABLET (750 MG) BY MOUTH DAILY (WITH DINNER) 90 tablet 3     PARoxetine (PAXIL) 30 MG tablet Take 1 tablet (30 mg) by mouth At Bedtime 90 tablet 3     Prenatal Vit-Fe Fumarate-FA (PRENATAL PO)        triamcinolone (KENALOG) 0.1 % external cream   0       Mammogram not appropriate for this patient based on age.    Pertinent mammograms are reviewed under the imaging tab.  History of abnormal Pap smear: NO - age 30- 65 PAP every 3 years recommended  PAP / HPV 12/1/2016 11/5/2013 10/5/2011   PAP NIL NIL NIL     Reviewed and updated as needed this visit by clinical staff  Tobacco  Allergies  Meds         Reviewed and updated as needed this visit by Provider            CONSTITUTIONAL: NEGATIVE for fever, chills, change in weight  INTEGUMENTARU/SKIN: NEGATIVE for  worrisome rashes, moles or lesions  EYES: NEGATIVE for vision changes or irritation  ENT: NEGATIVE for ear, mouth and throat problems  RESP: NEGATIVE for significant cough or SOB  BREAST: NEGATIVE for masses, tenderness or discharge  CV: NEGATIVE for chest pain, palpitations or peripheral edema  GI: NEGATIVE for nausea, abdominal pain, heartburn, or change in bowel habits  : NEGATIVE for unusual urinary or vaginal symptoms. Periods are regular.  MUSCULOSKELETAL: NEGATIVE for significant arthralgias or myalgia  NEURO: NEGATIVE for weakness, dizziness or paresthesias  PSYCHIATRIC: NEGATIVE for changes in mood or affect     OBJECTIVE:   /88   Pulse 96   Temp 97.2  F (36.2  C) (Temporal)   Resp 20   Wt 122 kg (269 lb)   LMP 05/19/2019   SpO2 100%   BMI 43.95 kg/m    Physical Exam  GENERAL: healthy, alert and no distress  NECK: no adenopathy, no asymmetry, masses, or scars and thyroid normal to palpation  RESP: lungs clear to auscultation - no rales, rhonchi or wheezes  CV: regular rate and rhythm, normal S1 S2, no S3 or S4, no murmur, click or rub, no peripheral edema and peripheral pulses strong  ABDOMEN: soft, nontender, no hepatosplenomegaly, no masses and bowel sounds normal   (female): normal female external genitalia, normal urethral meatus, vaginal mucosa, normal cervix/adnexa/uterus without masses or discharge  MS: no gross musculoskeletal defects noted, no edema  SKIN: Patient has a excoriated rash on both forearms they have been doing a lot of yard work this is consistent with a infected poison ivy dermatitis  NEURO: Normal strength and tone, mentation intact and speech normal  PSYCH: mentation appears normal, affect normal/bright    Diagnostic Test Results:  Labs reviewed in Epic  none     ASSESSMENT/PLAN:   1. Contact dermatitis due to poison ivy    - predniSONE (DELTASONE) 20 MG tablet; 60 mg by mouth  for 5 days then stop  Dispense: 15 tablet; Refill: 0  - cephALEXin (KEFLEX) 500 MG  "capsule; Take 1 capsule (500 mg) by mouth 4 times daily  Dispense: 21 capsule; Refill: 0    2. Encounter for routine gynecological examination    - Pap imaged thin layer screen with HPV - recommended age 30 - 65 years (select HPV order below)  - HPV High Risk Types DNA Cervical    Will contact the patient with the report on her Pap smear she will contact me if the prednisone and the Keflex do not take care of her poison ivy.    3.  All major depression stable on meds change    4.  PCOS stable on meds  COUNSELING:  Reviewed preventive health counseling, as reflected in patient instructions       Regular exercise       Healthy diet/nutrition  I did have a discussion with her about her weight and diet and exercise they do need to get daily aerobic exercise her  was there today so he heard the conversation.  Estimated body mass index is 43.95 kg/m  as calculated from the following:    Height as of 3/28/19: 1.666 m (5' 5.6\").    Weight as of this encounter: 122 kg (269 lb).    Weight management plan: Discussed healthy diet and exercise guidelines     reports that she has never smoked. She has never used smokeless tobacco.      Counseling Resources:  ATP IV Guidelines  Pooled Cohorts Equation Calculator  Breast Cancer Risk Calculator  FRAX Risk Assessment  ICSI Preventive Guidelines  Dietary Guidelines for Americans, 2010  USDA's MyPlate  ASA Prophylaxis  Lung CA Screening    Fernando Qiugley MD, MD  [facility]  "

## 2019-05-28 LAB
COPATH REPORT: NORMAL
PAP: NORMAL

## 2019-05-30 LAB
FINAL DIAGNOSIS: NORMAL
HPV HR 12 DNA CVX QL NAA+PROBE: NEGATIVE
HPV16 DNA SPEC QL NAA+PROBE: NEGATIVE
HPV18 DNA SPEC QL NAA+PROBE: NEGATIVE
SPECIMEN DESCRIPTION: NORMAL
SPECIMEN SOURCE CVX/VAG CYTO: NORMAL

## 2019-08-12 ENCOUNTER — TELEPHONE (OUTPATIENT)
Dept: FAMILY MEDICINE | Facility: CLINIC | Age: 34
End: 2019-08-12

## 2019-08-12 DIAGNOSIS — F33.0 MAJOR DEPRESSIVE DISORDER, RECURRENT EPISODE, MILD (H): Primary | ICD-10-CM

## 2019-08-12 RX ORDER — PAROXETINE 40 MG/1
40 TABLET, FILM COATED ORAL EVERY MORNING
Qty: 60 TABLET | Refills: 1 | Status: SHIPPED | OUTPATIENT
Start: 2019-08-12 | End: 2019-08-20

## 2019-08-12 NOTE — TELEPHONE ENCOUNTER
Is this ok to use a Dr. Only for patient to discus medication change?  Than have her keep the Sept one for follow up on the medication.   Grei LOPEZ

## 2019-08-12 NOTE — TELEPHONE ENCOUNTER
Maria T Lees has an appt with you on 9/12/19. It looks like she has been on Paxil since 12/12/13. Current dose is 30 mg at HS. Please read her note. It appears it isn't helping her that much since 7/12/19.  Would you like her to adjust her dose until that appt or are you able to work her into your schedule any earlier than current appt date?...................DUANE Abbott

## 2019-08-12 NOTE — TELEPHONE ENCOUNTER
For triage RN, see my chart message    Appointment For: Maria T Armando ([id number])   [facility]      9/12/2019    8:40 AM  20 mins.  Fernando Quigley MD      [facility]      Patient Comments:   I feel that my Paxil isn t effective anymore for my anxiety. My  had a routine surgical procedure on 7/15. Seeing him lying on the gurney post-op made me feel like something in my head just snapped. Since then, I ve been super crabby, flying off the handle at the littlest things. I can t stop fidgeting. I have sudden crying fits and/or I can t talk or express what I m feeling. I say stuff involuntarily when old worries run through my head.

## 2019-08-13 NOTE — TELEPHONE ENCOUNTER
[facility] Medicine  History & Physical    Patient Name: Carmencita Bryson  MRN: [id number]  Admission Date: 2018  Attending Physician: Maria Alejandra Weeks MD   Primary Care Provider: Jean Pierre Chappell MD         Patient information was obtained from patient, past medical records and ER records.     Subjective:     Principal Problem:Acute hypoxemic respiratory failure    Chief Complaint:   Chief Complaint   Patient presents with    URI        HPI: 18 Carmencita Bryson is a 38 y.o. female presented to ER last PM  with wheezing with an oxygen saturation of 88%  Patient had wheezing all fields with diminished air exchange, no asthma history  Patient has been having a dry hacking cough for the last month, worse last week  Patient had no fever, no history of hypoxia, has never required oxygen in the past  IV steroids given the ER has helped, patients there with a 90% room air oxygen;  She was given IV Rocephin, zithromax and Solumedrol 125mg IV x 1    feeling a lot  better this am; states she has not smoked in 4 days. Cough is not as bad.              Past Medical History:   Diagnosis Date    Anxiety        Past Surgical History:   Procedure Laterality Date    APPENDECTOMY      APPENDECTOMY-LAPAROSCOPIC N/A 2017    Performed by Gary [facility]maguer MD at [facility] OR     SECTION      CHOLECYSTECTOMY         Review of patient's allergies indicates:   Allergen Reactions    Tramadol Rash       No current facility-administered medications on file prior to encounter.      Current Outpatient Medications on File Prior to Encounter   Medication Sig    diazePAM (VALIUM) 5 MG tablet Take 5 mg by mouth every 6 (six) hours as needed for Anxiety.    albuterol 90 mcg/actuation inhaler Inhale 1-2 puffs into the lungs every 6 (six) hours as needed for Wheezing. Rescue    amoxicillin-clavulanate 875-125mg (AUGMENTIN) 875-125 mg per tablet Take 1 tablet by mouth 2 (two) times daily.     Patient scheduled.   Geri LOPEZ      cetirizine (ZYRTEC) 10 MG tablet Take 10 mg by mouth once daily.    ciclopirox (LOPROX) 0.77 % Crea Apply topically 2 (two) times daily.    ketorolac (TORADOL) 10 mg tablet Take 1 tablet (10 mg total) by mouth every 6 (six) hours as needed for Pain.    methylPREDNISolone (MEDROL DOSEPACK) 4 mg tablet Pack as directed    ondansetron (ZOFRAN-ODT) 4 MG TbDL Take 1 tablet (4 mg total) by mouth every 8 (eight) hours as needed (nausea).    oxycodone-acetaminophen (PERCOCET)  mg per tablet Take 1 tablet by mouth every 4 (four) hours as needed for Pain.    triamcinolone acetonide 0.1% (KENALOG) 0.1 % cream Apply topically 2 (two) times daily. Apply to each ear canal bid     Family History     Problem Relation (Age of Onset)    Hypertension Father        Tobacco Use    Smoking status: Current Every Day Smoker     Packs/day: 1.00     Years: 1.00     Pack years: 1.00     Types: Cigarettes     Start date: 9/25/2017    Smokeless tobacco: Never Used   Substance and Sexual Activity    Alcohol use: Yes     Comment: occ    Drug use: No    Sexual activity: Yes     Partners: Male     Review of Systems   Constitutional: Negative for chills, fatigue and fever.   HENT: Negative for congestion, ear pain, postnasal drip, sinus pressure, sneezing and sore throat.    Eyes: Negative for discharge.   Respiratory: Positive for cough, chest tightness, shortness of breath and wheezing.    Cardiovascular: Negative.    Gastrointestinal: Negative for abdominal pain, constipation, diarrhea, nausea and vomiting.   Genitourinary: Negative for difficulty urinating, flank pain and hematuria.   Musculoskeletal: Negative.  Negative for arthralgias and joint swelling.   Skin: Negative.    Neurological: Positive for headaches. Negative for dizziness.   Psychiatric/Behavioral: Negative for behavioral problems and confusion.     Objective:     Vital Signs (Most Recent):  Temp: 97.3 °F (36.3 °C) (09/25/18 0706)  Pulse: 79 (09/25/18 0800)  Resp:  16 (09/25/18 0751)  BP: 129/60 (09/25/18 0706)  SpO2: (!) 92 % (09/25/18 0751) Vital Signs (24h Range):  Temp:  [97.3 °F (36.3 °C)-97.6 °F (36.4 °C)] 97.3 °F (36.3 °C)  Pulse:  [] 79  Resp:  [16-24] 16  SpO2:  [88 %-93 %] 92 %  BP: (129-144)/(60-87) 129/60     Weight: 136.1 kg (300 lb)  Body mass index is 46.99 kg/m².    Physical Exam   Constitutional: She is oriented to person, place, and time. She appears well-developed and well-nourished.   HENT:   Head: Normocephalic and atraumatic.   Right Ear: External ear normal.   Left Ear: External ear normal.   Nose: Nose normal.   Mouth/Throat: Oropharynx is clear and moist.   Eyes: Conjunctivae and EOM are normal. Pupils are equal, round, and reactive to light.   Neck: Normal range of motion. Neck supple. No thyromegaly present.   Cardiovascular: Normal rate, regular rhythm, normal heart sounds and intact distal pulses.   Pulmonary/Chest: She is in respiratory distress. She has wheezes. She has rales.   Abdominal: Soft. Bowel sounds are normal.   Musculoskeletal: Normal range of motion.   Neurological: She is alert and oriented to person, place, and time. She has normal reflexes.   Skin: Skin is warm and dry.   Psychiatric: She has a normal mood and affect. Her behavior is normal. Judgment and thought content normal.   Nursing note and vitals reviewed.        CRANIAL NERVES     CN III, IV, VI   Pupils are equal, round, and reactive to light.  Extraocular motions are normal.        Significant Labs:     Recent Labs   Lab  09/25/18   0110   PH  7.45   PCO2  37   HCO3  25.70*   POCSATURATED  92.9   BE  1.80   TOTALHB  13.1   COHB  0.6   METHB  0.5     Blood Culture: No results for input(s): LABBLOO in the last 48 hours.  CBC:   Recent Labs   Lab  09/25/18   0109   WBC  15.66*   HGB  12.8   HCT  38.0   PLT  286     CMP:   Recent Labs   Lab  09/25/18   0109   NA  139   K  4.0   CL  106   CO2  21*   GLU  111*   BUN  11   CREATININE  0.8   CALCIUM  9.9   PROT  7.2    ALBUMIN  3.9   BILITOT  1.2*   ALKPHOS  81   AST  46*   ALT  41   ANIONGAP  12   EGFRNONAA  >60   Troponin:   Recent Labs   Lab  09/25/18   0109   TROPONINI  <0.006       Significant Imaging: see below   CXR :No acute abnormality  ABG : hypoxia      Assessment/Plan:     * Acute hypoxemic respiratory failure    O2   Steroids  Nebs  IV antibiotics           Smoker      Nicotine patch   long discussion with pt regarding risks of smoking and long term damage  Refer to Smoking cessation as OP         Acute bronchitis due to other specified organisms    o2- sat 88% on RA  Neb tx  Continue IV steroids  ABX rocephin  Possible d/c tomorrow if wheezing and sat better          Hypoxia    Looks like copd exacerbation  Continue oxygen  Neb tx, iv steroid           Chronic headache    Tylenol an dibuprofen            VTE Risk Mitigation (From admission, onward)        Ordered     IP VTE HIGH RISK PATIENT  Once      09/25/18 0739     Place sequential compression device  Until discontinued      09/25/18 0739             Vicki Hobbs MD  Department of Hospital Medicine   [facility]

## 2019-08-20 ENCOUNTER — OFFICE VISIT (OUTPATIENT)
Dept: FAMILY MEDICINE | Facility: CLINIC | Age: 34
End: 2019-08-20
Payer: COMMERCIAL

## 2019-08-20 VITALS
OXYGEN SATURATION: 98 % | HEART RATE: 100 BPM | BODY MASS INDEX: 43.62 KG/M2 | RESPIRATION RATE: 14 BRPM | SYSTOLIC BLOOD PRESSURE: 130 MMHG | DIASTOLIC BLOOD PRESSURE: 82 MMHG | TEMPERATURE: 97.4 F | WEIGHT: 267 LBS

## 2019-08-20 DIAGNOSIS — F33.0 MAJOR DEPRESSIVE DISORDER, RECURRENT EPISODE, MILD (H): Primary | ICD-10-CM

## 2019-08-20 PROCEDURE — 99214 OFFICE O/P EST MOD 30 MIN: CPT | Performed by: FAMILY MEDICINE

## 2019-08-20 ASSESSMENT — ANXIETY QUESTIONNAIRES
2. NOT BEING ABLE TO STOP OR CONTROL WORRYING: MORE THAN HALF THE DAYS
1. FEELING NERVOUS, ANXIOUS, OR ON EDGE: NEARLY EVERY DAY
3. WORRYING TOO MUCH ABOUT DIFFERENT THINGS: MORE THAN HALF THE DAYS
IF YOU CHECKED OFF ANY PROBLEMS ON THIS QUESTIONNAIRE, HOW DIFFICULT HAVE THESE PROBLEMS MADE IT FOR YOU TO DO YOUR WORK, TAKE CARE OF THINGS AT HOME, OR GET ALONG WITH OTHER PEOPLE: VERY DIFFICULT
IF YOU CHECKED OFF ANY PROBLEMS ON THIS QUESTIONNAIRE, HOW DIFFICULT HAVE THESE PROBLEMS MADE IT FOR YOU TO DO YOUR WORK, TAKE CARE OF THINGS AT HOME, OR GET ALONG WITH OTHER PEOPLE: VERY DIFFICULT
6. BECOMING EASILY ANNOYED OR IRRITABLE: NEARLY EVERY DAY
5. BEING SO RESTLESS THAT IT IS HARD TO SIT STILL: NEARLY EVERY DAY
GAD7 TOTAL SCORE: 19
6. BECOMING EASILY ANNOYED OR IRRITABLE: NEARLY EVERY DAY
7. FEELING AFRAID AS IF SOMETHING AWFUL MIGHT HAPPEN: MORE THAN HALF THE DAYS
1. FEELING NERVOUS, ANXIOUS, OR ON EDGE: NEARLY EVERY DAY
3. WORRYING TOO MUCH ABOUT DIFFERENT THINGS: NEARLY EVERY DAY
7. FEELING AFRAID AS IF SOMETHING AWFUL MIGHT HAPPEN: MORE THAN HALF THE DAYS
5. BEING SO RESTLESS THAT IT IS HARD TO SIT STILL: NEARLY EVERY DAY
2. NOT BEING ABLE TO STOP OR CONTROL WORRYING: MORE THAN HALF THE DAYS
GAD7 TOTAL SCORE: 18

## 2019-08-20 ASSESSMENT — PATIENT HEALTH QUESTIONNAIRE - PHQ9
SUM OF ALL RESPONSES TO PHQ QUESTIONS 1-9: 13
5. POOR APPETITE OR OVEREATING: NEARLY EVERY DAY
5. POOR APPETITE OR OVEREATING: NEARLY EVERY DAY

## 2019-08-20 ASSESSMENT — PAIN SCALES - GENERAL: PAINLEVEL: MILD PAIN (2)

## 2019-08-20 NOTE — PROGRESS NOTES
Subjective     Maria T Armando is a 34 year old female who presents to clinic today for the following health issues:    HPI   Depression and Anxiety Follow-Up    How are you doing with your depression since your last visit? Worsened     How are you doing with your anxiety since your last visit?  Worsened alot    Are you having other symptoms that might be associated with depression or anxiety? Yes:  chronic aching    Have you had a significant life event? Financial Concerns and OTHER:  sick     Do you have any concerns with your use of alcohol or other drugs? No      She is going through a lot of stress at home her  has reflux and has intermittent vomiting.  So much that when he bends over at work he refluxes bile and then he vomits so he has to go home from work.  He is missed a lot of work he is been at home on the couch feeling sorry for himself but this is dragging Maria T down.  She states her job is going well everything is going well except for her 's illness.  No other complaints at this time.  I did have a long discussion with her about the fact that he needs to go in and get some counseling as he works through this illness.  They are considering a Nissen fundoplication to see if they can correct this problem but until he gets this done he needs to be able to talk to somebody about it.  Did tell her I would not change her medications at this time because she is reacting to the situation and she has been stable up until this situational change.  No other complaints at this time.  Social History     Tobacco Use     Smoking status: Never Smoker     Smokeless tobacco: Never Used   Substance Use Topics     Alcohol use: Yes     Alcohol/week: 0.6 oz     Types: 1 Standard drinks or equivalent per week     Comment: 1 monthly     Drug use: No     PHQ 2/1/2018 3/28/2019 8/20/2019   PHQ-9 Total Score 3 7 13   Q9: Thoughts of better off dead/self-harm past 2 weeks Not at all Several days Several  days   F/U: Thoughts of suicide or self-harm - No No   F/U: Safety concerns - No No     TIFFANIE-7 SCORE 2/1/2018 3/28/2019 8/20/2019   Total Score - - -   Total Score 5 7 19     No flowsheet data found.  No flowsheet data found.      Suicide Assessment Five-step Evaluation and Treatment (SAFE-T)          Patient Active Problem List   Diagnosis     Allergic rhinitis     CARDIOVASCULAR SCREENING; LDL GOAL LESS THAN 160     Mild major depression (H)     Anxiety state     Major depressive disorder, recurrent episode, mild (H)     TREY (obstructive sleep apnea)     PCOS (polycystic ovarian syndrome)     Past Surgical History:   Procedure Laterality Date     DENTAL SURGERY      wisdom teeth removed.        Social History     Tobacco Use     Smoking status: Never Smoker     Smokeless tobacco: Never Used   Substance Use Topics     Alcohol use: Yes     Alcohol/week: 0.6 oz     Types: 1 Standard drinks or equivalent per week     Comment: 1 monthly     Family History   Problem Relation Age of Onset     Lipids Mother         TREY     Hypertension Mother      Diabetes Mother      Obesity Mother      Family History Negative Father         snores     Gastrointestinal Disease Father      Depression Father      Breast Cancer Maternal Grandmother          Current Outpatient Medications   Medication Sig Dispense Refill     metFORMIN (GLUCOPHAGE-XR) 750 MG 24 hr tablet TAKE 1 TABLET (750 MG) BY MOUTH DAILY (WITH DINNER) 90 tablet 3     PARoxetine (PAXIL) 30 MG tablet Take 1 tablet (30 mg) by mouth At Bedtime 90 tablet 3     Prenatal Vit-Fe Fumarate-FA (PRENATAL PO)        acetaminophen (TYLENOL) 500 MG tablet Take 500-1,000 mg by mouth every 6 hours as needed historical           Reviewed and updated as needed this visit by Provider         Review of Systems   ROS COMP: Constitutional, HEENT, cardiovascular, pulmonary, gi and gu systems are negative, except as otherwise noted.      Objective    /82   Pulse 100   Temp 97.4  F (36.3   C) (Temporal)   Resp 14   Wt 121.1 kg (267 lb)   LMP 07/13/2019   SpO2 98%   BMI 43.62 kg/m    Body mass index is 43.62 kg/m .  Physical Exam   GENERAL: healthy and mild  distress        Assessment & Plan   Assessment      Plan  1. Major depressive disorder, recurrent episode, mild (H)  With reactive depression because of her 's illness.  She will continue on her Paxil at 30 mg daily.  Continue to work.  I did state she can contact me at any time if things seem to be going south.  She is going to try to get her  into counseling as he is working through his illness and potential upcoming surgery.  I will see her back in 2 to 3 months time if things remain somewhat stable    I spent 25 minutes with this patient over 50% of the time was in healthcare counseling, careplan development, strategies for partnering in keeping this patient healthy and appropriate referrals and follow up      Fernando Quigley MD, MD  [facility]

## 2019-08-21 ASSESSMENT — ANXIETY QUESTIONNAIRES: GAD7 TOTAL SCORE: 18

## 2019-08-29 ENCOUNTER — VIRTUAL VISIT (OUTPATIENT)
Dept: FAMILY MEDICINE | Facility: OTHER | Age: 34
End: 2019-08-29

## 2019-08-29 NOTE — PROGRESS NOTES
"Date: [id number]  Clinician: Sunil Waller  Clinician NPI: [id number]  Patient: Maria T Armando  Patient : 1985  Patient Address: [address], [place] [address]  Patient Phone: [phone]  Visit Protocol: UTI  Patient Summary:  Maria T is a 34 year old ( : 1985 ) female who initiated a Visit for a presumed bladder infection. When asked the question \"Please sign me up to receive news, health information and promotions from [employer].\", Maria T responded \"No\".   Her symptoms started today and consist of urinary frequency, urgency, urinary incontinence, dysuria, feeling as if the bladder is never empty, and abdominal pain.   Symptom details     Urine color: The color of her urine is yellow.     Abdominal pain: The pain is mild (1-3 on a 10 point pain scale).      Denied symptoms include chills, vaginal discharge, vomiting, vaginal itching, foul-smelling urine, nausea, and flank pain. She does not feel feverish.   Maria T has not used any over-the-counter medications or home remedies to relieve her current symptoms.  Precipitating events  Maria T denies having a sexually transmitted disease.  Pertinent medical history  Maria T has had a bladder infection before and has had 2 in the past 12 months. Her most recent bladder infection was not within the last 4 weeks. Her current symptoms are similar to her previous bladder infection symptoms.   Cephalexin (Keflex) and sulfamethoxazole-trimethoprim (Bactrim DS) has been effective in treating her past bladder infections.   She has experienced problems or side effects with the following antibiotics in the past: nitrofurantoin (Macrobid).  Problems or side effects as reported by the patient (free text): Nausea   Maria T has not been prescribed antibiotics to prevent frequent or repeated bladder infections in the past and does not get yeast infections when she takes antibiotics.   Maria T does not have a history of kidney stones. She has not " used a catheter or been a patient in a hospital or nursing home in the past 2 weeks.   Maria T does not smoke or use smokeless tobacco.   She denies pregnancy and denies breastfeeding. She has menstruated in the past month.     MEDICATIONS: metformin oral, Prenatal oral, Paxil oral, ALLERGIES: Pertussis Vaccines  Clinician Response:  Dear Maria T,  Based on the information you have provided, you likely have an acute urinary tract infection, also called a bladder infection. Bladder infections occur when bacteria from the outside of the body enters the urinary tract. Any part of the urinary system can be infected, but the bladder is the most common.  Medication information  I am prescribing:     Sulfamethoxazole-trimethoprim (Bactrim DS) 800-160 mg oral tablet. Take 1 tablet by mouth every 12 hours for 3 days. There are no refills with this prescription.   Certain antibiotics such as Bactrim and Ciprofloxacin can cause your skin to be more sensitive to the sun. Exposure to the sun when taking these antibiotics may cause skin rash, itching, redness, or a severe sunburn. Be sure to avoid prolonged or direct exposure to the sun, especially between 10 am and 3 pm, if possible. Wear protective clothing and use sunscreen of SPF 30 or higher when you are outdoors.  The medication I prescribed for your bladder infection is an antibiotic. Continue taking the medication until it is gone even if you feel better. If you get an upset stomach while taking antibiotics, taking the medication with food can help.   Yeast infections can be a common side effect of antibiotics. The most common symptom of a yeast infection is itchiness in and around the vagina. Other signs and symptoms include burning, redness, or a thick, white vaginal discharge that looks like cottage cheese and does not have a bad smell.  Self care  Urination helps to flush bacteria from the urinary tract. For this reason, drinking water and urinating often helps  relieve some urinary symptoms and can decrease your risk of getting bladder infections in the future.  Other steps you can take to prevent future bladder infections include:     Wipe front to back after using the bathroom    Urinate after sexual intercourse    Avoid using deodorant sprays, douches, or powders in the vaginal area     When to seek care  Please make an appointment to be seen in a clinic or urgent care if any of the following occur:     You develop new symptoms or your symptoms become worse    You have medication side effects that make it difficult to take them as prescribed    Your symptoms do not improve within 1-2 days of starting treatment    You have symptoms of a bladder infection that return shortly after completing treatment     It is possible to have an allergic reaction to an antibiotic even if you have not had one in the past. If you notice a new rash, significant swelling, or difficulty breathing, stop taking this medication immediately and go to a clinic or urgent care.   Diagnosis: Acute uncomplicated bladder infection  Diagnosis ICD: N39.0  Prescription: sulfamethoxazole-trimethoprim (Bactrim DS) 800-160 mg oral tablet 6 tablet, 3 days supply. Take 1 tablet by mouth every 12 hours for 3 days. Refills: 0, Refill as needed: no, Allow substitutions: yes  Pharmacy: [facility] - [phone] - [address] [place] [address]

## 2019-09-28 ENCOUNTER — HEALTH MAINTENANCE LETTER (OUTPATIENT)
Age: 34
End: 2019-09-28

## 2020-03-13 ENCOUNTER — VIRTUAL VISIT (OUTPATIENT)
Dept: FAMILY MEDICINE | Facility: OTHER | Age: 35
End: 2020-03-13

## 2020-03-14 NOTE — PROGRESS NOTES
"Date: 2020 16:17:41  Clinician: Estela Smith  Clinician NPI: [id number]  Patient: Maria T Armando  Patient : 1985  Patient Address: [address] [place] [address] [address] [address]  Patient Phone: [phone]  Visit Protocol: URI  Patient Summary:  Maria T is a 34 year old ( : 1985 ) female who initiated a Visit for COVID-19 (Coronavirus) evaluation and screening. When asked the question \"Please sign me up to receive news, health information and promotions from [employer].\", Maria T responded \"No\".    Maria T states her symptoms started 1-2 days ago.   Her symptoms consist of a sore throat, a cough, nasal congestion, malaise, rhinitis, and facial pain or pressure. Maria T also feels feverish.   Symptom details     Nasal secretions: The color of her mucus is clear.    Cough: Maria T coughs a few times an hour and her cough is not more bothersome at night. Phlegm does not come into her throat when she coughs. She does not believe her cough is caused by post-nasal drip.     Sore throat: Maria T reports having mild throat pain (1-3 on a 10 point pain scale), does not have exudate on her tonsils, and can swallow liquids. The lymph nodes in her neck are not enlarged. A rash has not appeared on the skin since the sore throat started.     Temperature: Her current temperature is 98.8 degrees Fahrenheit.     Facial pain or pressure: The facial pain or pressure feels worse when bending over or leaning forward.      Maria T denies having chills, wheezing, teeth pain, ear pain, headache, enlarged lymph nodes, and myalgias. She also denies taking antibiotic medication for the symptoms, having recent facial or sinus surgery in the past 60 days, and having a sinus infection within the past year. She is not experiencing dyspnea.   Precipitating events  Within the past week, Maria T has not been exposed to someone with strep throat. She has not recently been exposed to someone with influenza. Maria T has been in " close contact with the following high risk individuals: immunocompromised people, adults 65 or older, and people with asthma, heart disease or diabetes.   Pertinent COVID-19 (Coronavirus) information  Maria T has not traveled internationally or to the areas where COVID-19 (Coronavirus) is widespread in the last 14 days before the start of her symptoms.   Maria T has not had close contact with a laboratory-confirmed positive COVID-19 patient or someone under quarantine for suspected COVID-19 within 14 days of symptom onset.   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   Pertinent medical history  Maria T does not get yeast infections when she takes antibiotics.   Maria T needs a return to work/school note.   Weight: 264 lbs   Maria T does not smoke or use smokeless tobacco.   She denies pregnancy and denies breastfeeding. She has menstruated in the past month.   Weight: 264 lbs    MEDICATIONS: metformin oral, Prenatal oral, Paxil oral, ALLERGIES: Pertussis Vaccines  Clinician Response:  Dear Maria T,  Based on the information provided, you have a viral upper respiratory infection, otherwise known as a cold. Symptoms vary from person to person, but can include sneezing, coughing, a runny nose, sore throat, and headache and range from mild to severe.  Unfortunately, there are no medications that can cure a cold, so treatment is focused on controlling symptoms as much as possible. Most people gradually feel better until symptoms are gone in 1-2 weeks.  Medication information  Because you have a viral infection, antibiotics will not help you get better. Treating a viral infection with antibiotics could actually make you feel worse.  Unless you are allergic to the over-the-counter medication(s) below, I recommend using:       Acetaminophen (Tylenol or store brand) oral tablet. Take 1-2 tablets by mouth every 4-6 hours to help with the discomfort.      Ibuprofen (Advil or store brand) 200 mg  oral tablet. Take 1-3 tablets (200-600 mg) by mouth every 8 hours to help with the discomfort. Make sure to take the ibuprofen with food. Do not exceed 2400 mg in 24 hours.     Over-the-counter medications do not require a prescription. Ask the pharmacist if you have any questions.  Self care  The following tips will keep you as comfortable as possible while you recover:     Rest    Drink plenty of water and other liquids    Take a hot shower to loosen congestion    Use throat lozenges    Gargle with warm salt water (1/4 teaspoon of salt per 8 ounce glass of water)    Suck on frozen items such as popsicles or ice cubes    Drink hot tea with lemon and honey    Take a spoonful of honey to reduce your cough     When to seek care  Please be seen in a clinic or urgent care if new symptoms develop, or symptoms become worse.  Call 911 or go to the emergency room if you feel that your throat is closing off, you suddenly develop a rash, you are unable to swallow fluids, you are drooling, or you are having difficulty breathing.  Additional treatment plan   Dear Maria T,  Based on the information you have provided, it does not appear you need Coronavirus (COVID-19) testing.   At this time, we recommend testing primarily for those people who have symptoms of cough and fever and have either traveled to a known area of infection or have been exposed to someone with laboratory confirmed Coronavirus by close contact.   Coronavirus - General Information:   The coronavirus infection starts within 14 days of an exposure.  Symptoms are those of a respiratory infection (such as fever, cough).   If you have not had symptoms by day 15, you should be considered uninfected by coronavirus.   Coronavirus - Symptoms:    The coronavirus can cause a respiratory illness, such as bronchitis or pneumonia.  The most common symptoms are: cough, fever, and shortness of breath.   Other symptoms are: body aches, chills, diarrhea, fatigue, headache,  runny nose, and sore throat   Coronavirus - Exposure Risk Factors:   Exposure to a person who has been diagnosed with coronavirus.  Travel from an area with recent local transmission of coronavirus.  The CDC (www.cdc.gov) has the most up-to-date list of where the coronavirus outbreak is occurring.   Coronavirus - Spreading:    The virus likely spreads through respiratory droplets produced when a person coughs or sneezes. These respiratory droplets can travel approximately 6 feet and can remain on surfaces. Common disinfectants will kill the virus.  The CDC currently does not recommend healthy people wear masks.   Coronavirus - Protect Yourself:    Avoid close contact with people known to have this new coronavirus infection.  Wash hands often with soap and water or alcohol-based hand .  Avoid touching the eyes, nose or mouth.   Thank you for limiting contact with others, wearing a simple mask to cover your cough, practice good hand hygiene habits and accessing our virtual services where possible to limit the spread of this virus.  For more information about COVID19 and options for caring for yourself at home, please visit the CDC website at https://www.cdc.gov/coronavirus/2019-ncov/about/steps-when-sick.html   For more options for care at [facility], please visit our website at [email or web address]     COVID-19 (Coronavirus) General Information  With the increase in the number of COVID-19 (Coronavirus) cases, we understand you may have some questions. Below is some helpful information on COVID-19 (Coronavirus).  How can I protect myself and others from the COVID-19 (Coronavirus)?  Because there is currently no vaccine to prevent infection, the best way to protect yourself is to avoid being exposed to this virus. Put distance between yourself and other people if COVID-19 (Coronavirus) is spreading in your community. The virus is thought to spread mainly from person-to-person.      Between people who are in close contact with one another (within about 6 about) for prolonged period (10 minutes or longer).    Through respiratory droplets produced when an infected person coughs or sneezes.     The CDC recommends the following additional steps to protect yourself and others:     Wash your hands often with soap and water for at least 20 seconds, especially after blowing your nose, coughing, or sneezing; going to the bathroom; and before eating or preparing food.  Use an alcohol-based hand  that contains at least 60 percent alcohol if soap and water are not available.        Avoid touching your eyes, nose and mouth with unwashed hands.    Avoid close contact with people who are sick.    Stay home when you are sick.    Cover your cough or sneeze with a tissue, then throw the tissue in the trash.    Clean and disinfect frequently touched objects and surfaces.     You can help stop COVID-19 (Coronavirus) by knowing the signs and symptoms:     Fever    Cough    Shortness of breath     Contact your healthcare provider if   Develop symptoms   AND   Have been in close contact with a person known to have COVID-19 (Coronavirus) or live in or have recently traveled from an area with ongoing spread of COVID-19 (Coronavirus). Call ahead before you go to a doctor's office or emergency room. Tell them about your recent travel and your symptoms.   For the most up to date information, visit the CDC's website.  Steps to help prevent the spread of COVID-19 (Coronavirus) if you are sick  If you are sick with COVID-19 (Coronavirus) or suspect you are infected with the virus that causes COVID-19 (Coronavirus), follow the steps below to help prevent the disease from spreading&nbsp;to people in your home and community.     Stay home except to get medical care. Home isolation may be started in consultation with your healthcare clinician.    Separate yourself from other people and animals in your home.    Call  "ahead before visiting your doctor if you have a medical appointment.    Wear a facemask when you are around other people.    Cover your cough and sneezes.    Clean your hands often.    Avoid sharing personal household items.    Clean and disinfect frequently touched objects and surfaces everyday.    You will need to have someone drop off medications or household supplies (if needed) at your house without coming inside or in contact with you or others living in your house.    Monitor your symptoms and seek prompt medical care if your illness is worsening (e.g. Difficulty breathing).    Discontinue home isolation only in consultation with your healthcare provider.     For more detailed and up to date information on what to do if you are sick, visit this link: What to Do If You Are Sick With Coronavirus Disease 2019 (COVID-19).  Do I need to be tested for COVID-19 (Coronavirus)?     At this time, the limited number of tests available are controlled by the state and local health departments and are being reserved for more seriously ill patients, those with known exposure to confirmed patients, and those with recent travel (within 14 days) to countries with high rates of COVID-19 (Coronavirus).    Decisions on which patients receive testing will be based on the local spread of COVID-19 (Coronavirus) as well as the symptoms. Your healthcare provider will make the final decision on whether you should be tested.    In the meantime, if you have concerns that you may have been exposed, it is reasonable to practice \"social distancing.\"&nbsp; If you are ill with a cold or flu-like illness, please monitor your symptoms and reach out to your healthcare provider if your symptoms worsen.    For more up to date information, visit this link: COVID-19 (Coronavirus) Frequently Asked Questions and Answers.      Diagnosis: Acute upper respiratory infection, unspecified  Diagnosis ICD: J06.9  "

## 2020-04-08 ENCOUNTER — TELEPHONE (OUTPATIENT)
Dept: FAMILY MEDICINE | Facility: CLINIC | Age: 35
End: 2020-04-08

## 2020-04-08 NOTE — TELEPHONE ENCOUNTER
Patient is due for a PHQ-9.  Index start date:12/22/2019  Index end date:4/20/2020    Please call patient. Pt is active on [employer]. I have sent a PHQ-9 via [employer] and will postpone the encounter. Debra Sarkar CMA ([facility])

## 2020-04-10 ENCOUNTER — TELEPHONE (OUTPATIENT)
Dept: BEHAVIORAL HEALTH | Facility: CLINIC | Age: 35
End: 2020-04-10

## 2020-04-10 ENCOUNTER — E-VISIT (OUTPATIENT)
Dept: FAMILY MEDICINE | Facility: CLINIC | Age: 35
End: 2020-04-10

## 2020-04-10 DIAGNOSIS — F33.1 MODERATE EPISODE OF RECURRENT MAJOR DEPRESSIVE DISORDER (H): Primary | ICD-10-CM

## 2020-04-10 PROCEDURE — 99421 OL DIG E/M SVC 5-10 MIN: CPT | Performed by: PHYSICIAN ASSISTANT

## 2020-04-10 RX ORDER — PAROXETINE 40 MG/1
40 TABLET, FILM COATED ORAL EVERY MORNING
Qty: 90 TABLET | Refills: 1 | Status: SHIPPED | OUTPATIENT
Start: 2020-04-10 | End: 2020-09-01 | Stop reason: ALTCHOICE

## 2020-04-10 ASSESSMENT — PATIENT HEALTH QUESTIONNAIRE - PHQ9
SUM OF ALL RESPONSES TO PHQ QUESTIONS 1-9: 15
SUM OF ALL RESPONSES TO PHQ QUESTIONS 1-9: 15
10. IF YOU CHECKED OFF ANY PROBLEMS, HOW DIFFICULT HAVE THESE PROBLEMS MADE IT FOR YOU TO DO YOUR WORK, TAKE CARE OF THINGS AT HOME, OR GET ALONG WITH OTHER PEOPLE: VERY DIFFICULT

## 2020-04-10 ASSESSMENT — ANXIETY QUESTIONNAIRES
7. FEELING AFRAID AS IF SOMETHING AWFUL MIGHT HAPPEN: SEVERAL DAYS
4. TROUBLE RELAXING: MORE THAN HALF THE DAYS
3. WORRYING TOO MUCH ABOUT DIFFERENT THINGS: SEVERAL DAYS
GAD7 TOTAL SCORE: 9
1. FEELING NERVOUS, ANXIOUS, OR ON EDGE: SEVERAL DAYS
2. NOT BEING ABLE TO STOP OR CONTROL WORRYING: SEVERAL DAYS
5. BEING SO RESTLESS THAT IT IS HARD TO SIT STILL: SEVERAL DAYS
6. BECOMING EASILY ANNOYED OR IRRITABLE: MORE THAN HALF THE DAYS
GAD7 TOTAL SCORE: 9
7. FEELING AFRAID AS IF SOMETHING AWFUL MIGHT HAPPEN: SEVERAL DAYS
GAD7 TOTAL SCORE: 9

## 2020-04-10 NOTE — TELEPHONE ENCOUNTER
Pt completed a PHQ-9.    PHQ 4/8/2020   PHQ-9 Total Score 15   Q9: Thoughts of better off dead/self-harm past 2 weeks Not at all   F/U: Thoughts of suicide or self-harm -   F/U: Safety concerns -     Debra Sarkar CMA ([facility])

## 2020-04-10 NOTE — TELEPHONE ENCOUNTER
Phone Encounter   [facility] made attempt to reach patient, by Provider request. LM stating this writer wanted to follow up from today's phone visit and that she can check her [employer] message for more information.    Patient may schedule a phone or video visit with [facility] if she would like work on coping strategies for mental health.    EMORY Duarte, Behavioral Health Clinician

## 2020-04-10 NOTE — PATIENT INSTRUCTIONS
Depression: Tips to Help Yourself    As your healthcare providers help treat your depression, you can also help yourself. Keep in mind that your illness affects you emotionally, physically, mentally, and socially. So full recovery will take time. Take care of your body and your soul, and be patient with yourself as you get better.  Self-care    Educate yourself. Read about treatment and medicine options. If you have the energy, attend local conferences or support groups. Keep a list of useful websites and helpful books and use them as needed. This illness is not your fault. Don t blame yourself for your depression.    Manage early symptoms. If you notice symptoms returning, experience triggers, or identify other factors that may lead to a depressive episode, get help as soon as possible. Ask trusted friends and family to monitor your behavior and let you know if they see anything of concern.    Work with your provider. Find a provider you can trust. Communicate honestly with that person and share information on your treatment for depression and your reaction to medicines.    Be prepared for a crisis. Know what to do if you experience a crisis. Keep the phone number of a crisis hotline and know the location of your community's urgent care centers and the closest emergency department.    Hold off on big decisions. Depression can cloud your judgment. So wait until you feel better before making major life decisions, such as changing jobs, moving, or getting  or .    Be patient. Recovering from depression is a process. Don t be discouraged if it takes some time to feel better.    Keep it simple. Depression saps your energy and concentration. So you won t be able to do all the things you used to do. Set small goals and do what you can.    Be with others. Don t isolate yourself--you ll only feel worse. Try to be with other people. And take part in fun activities when you can. Go to a movie, ballgame,  Sikh service, or social event. Talk openly with people you can trust. And accept help when it s offered.  Take care of your body  People with depression often lose the desire to take care of themselves. That only makes their problems worse. During treatment and afterward, make a point to:    Exercise. It s a great way to take care of your body. And studies have shown that exercise helps fight depression.    Avoid drugs and alcohol. These may ease the pain in the short term. But they ll only make your problems worse in the long run.    Get relief from stress. Ask your healthcare provider for relaxation exercises and techniques to help relieve stress.    Eat right. A balanced and healthy diet helps keep your body healthy.  Date Last Reviewed: 1/1/2017 2000-2019 [employer]. [address] [place] [address], [place] [address]. All rights reserved. This information is not intended as a substitute for professional medical care. Always follow your healthcare professional's instructions.

## 2020-04-11 ASSESSMENT — ANXIETY QUESTIONNAIRES: GAD7 TOTAL SCORE: 9

## 2020-04-11 ASSESSMENT — PATIENT HEALTH QUESTIONNAIRE - PHQ9: SUM OF ALL RESPONSES TO PHQ QUESTIONS 1-9: 15

## 2020-04-29 ENCOUNTER — MYC REFILL (OUTPATIENT)
Dept: FAMILY MEDICINE | Facility: CLINIC | Age: 35
End: 2020-04-29

## 2020-04-29 DIAGNOSIS — N92.6 IRREGULAR MENSTRUAL CYCLE: ICD-10-CM

## 2020-04-30 RX ORDER — METFORMIN HYDROCHLORIDE 750 MG/1
TABLET, EXTENDED RELEASE ORAL
Qty: 90 TABLET | Refills: 3 | Status: SHIPPED | OUTPATIENT
Start: 2020-04-30 | End: 2021-05-03

## 2020-04-30 NOTE — TELEPHONE ENCOUNTER
"Requested Prescriptions   Pending Prescriptions Disp Refills     metFORMIN (GLUCOPHAGE-XR) 750 MG 24 hr tablet 90 tablet 3     Sig: TAKE 1 TABLET (750 MG) BY MOUTH DAILY (WITH DINNER)       Biguanide Agents Passed - 4/29/2020 11:10 PM        Passed - Patient is age 10 or older        Passed - Recent (12 mo) or future (30 days) visit within the authorizing provider's specialty      Patient has had an office visit with the authorizing provider or a provider within the authorizing providers department within the previous 12 mos or has a future within next 30 days. See \"Patient Info\" tab in inbasket, or \"Choose Columns\" in Meds & Orders section of the refill encounter.              Passed - Patient does NOT have a diagnosis of CHF.        Passed - Medication is active on med list        Passed - Patient is not pregnant        Passed - Patient has not had a positive pregnancy test within the past 12 mos.              "

## 2020-07-07 ENCOUNTER — E-VISIT (OUTPATIENT)
Dept: FAMILY MEDICINE | Facility: CLINIC | Age: 35
End: 2020-07-07
Payer: COMMERCIAL

## 2020-07-07 DIAGNOSIS — L03.119 CELLULITIS AND ABSCESS OF LEG: Primary | ICD-10-CM

## 2020-07-07 DIAGNOSIS — L02.419 CELLULITIS AND ABSCESS OF LEG: Primary | ICD-10-CM

## 2020-07-07 PROCEDURE — 99422 OL DIG E/M SVC 11-20 MIN: CPT | Performed by: FAMILY MEDICINE

## 2020-07-07 RX ORDER — CEPHALEXIN 500 MG/1
500 CAPSULE ORAL 3 TIMES DAILY
Qty: 21 CAPSULE | Refills: 0 | Status: SHIPPED | OUTPATIENT
Start: 2020-07-07 | End: 2020-07-14

## 2020-07-07 NOTE — PATIENT INSTRUCTIONS
Antibiotics as directed- Keflex 500 mg 3 times daily for 7 days.  Ibuprofen as needed for pain- may take 2-3 tablets up to 3 times daily with food.  Apply a warm wet compress 3-4 times a day to help area to heal.  Do your best to elevate to reduce swelling.  Discussed close evaluation of symptoms.   Monitor for signs of infection including fever, thick yellow/white discharge, a hard or soft lump under the skin or increasing pain, redness, warmth and/or swelling or a red streak moving up your leg. If you have any of these or persistent symptoms that are not healing, follow up with your primary care provider.  Follow up with primary care provider with any problems, questions or concerns or if symptoms worsen or fail to improve.

## 2020-08-12 ENCOUNTER — E-VISIT (OUTPATIENT)
Dept: FAMILY MEDICINE | Facility: CLINIC | Age: 35
End: 2020-08-12
Payer: COMMERCIAL

## 2020-08-12 DIAGNOSIS — F32.A DEPRESSION, UNSPECIFIED DEPRESSION TYPE: Primary | ICD-10-CM

## 2020-08-12 PROCEDURE — 99421 OL DIG E/M SVC 5-10 MIN: CPT | Performed by: FAMILY MEDICINE

## 2020-08-12 ASSESSMENT — ANXIETY QUESTIONNAIRES
6. BECOMING EASILY ANNOYED OR IRRITABLE: NEARLY EVERY DAY
GAD7 TOTAL SCORE: 11
5. BEING SO RESTLESS THAT IT IS HARD TO SIT STILL: SEVERAL DAYS
7. FEELING AFRAID AS IF SOMETHING AWFUL MIGHT HAPPEN: SEVERAL DAYS
GAD7 TOTAL SCORE: 11
3. WORRYING TOO MUCH ABOUT DIFFERENT THINGS: SEVERAL DAYS
4. TROUBLE RELAXING: NEARLY EVERY DAY
7. FEELING AFRAID AS IF SOMETHING AWFUL MIGHT HAPPEN: SEVERAL DAYS
GAD7 TOTAL SCORE: 11
2. NOT BEING ABLE TO STOP OR CONTROL WORRYING: SEVERAL DAYS
1. FEELING NERVOUS, ANXIOUS, OR ON EDGE: SEVERAL DAYS

## 2020-08-12 ASSESSMENT — PATIENT HEALTH QUESTIONNAIRE - PHQ9
SUM OF ALL RESPONSES TO PHQ QUESTIONS 1-9: 13
10. IF YOU CHECKED OFF ANY PROBLEMS, HOW DIFFICULT HAVE THESE PROBLEMS MADE IT FOR YOU TO DO YOUR WORK, TAKE CARE OF THINGS AT HOME, OR GET ALONG WITH OTHER PEOPLE: VERY DIFFICULT
SUM OF ALL RESPONSES TO PHQ QUESTIONS 1-9: 13

## 2020-08-12 NOTE — TELEPHONE ENCOUNTER
Provider E-Visit time total (minutes): 10  R counseling service reach out to the patient and will get her into psychiatry for a formal evaluation.

## 2020-08-13 ENCOUNTER — TELEPHONE (OUTPATIENT)
Dept: BEHAVIORAL HEALTH | Facility: CLINIC | Age: 35
End: 2020-08-13

## 2020-08-13 ENCOUNTER — MYC MEDICAL ADVICE (OUTPATIENT)
Dept: BEHAVIORAL HEALTH | Facility: CLINIC | Age: 35
End: 2020-08-13

## 2020-08-13 ASSESSMENT — ANXIETY QUESTIONNAIRES: GAD7 TOTAL SCORE: 11

## 2020-08-13 ASSESSMENT — PATIENT HEALTH QUESTIONNAIRE - PHQ9: SUM OF ALL RESPONSES TO PHQ QUESTIONS 1-9: 13

## 2020-08-13 NOTE — TELEPHONE ENCOUNTER
Phone Encounter  [facility] spoke with patient, by PCP request.  Patient expressed concern with her mood. Patient states that she has been on Paxil since 2013 and has been taking the 30mg dose for the last five years. Patient had reached out more recently, to her PCP, with concerns about worsening mood. And they decided to monitor and then it was when the pandemic hit that they discussed increasing her med. In April 2020, her dose was increased to 40mg. She states that all of a sudden she experienced more insomnia, which she had never experienced before. She reflected that her sleep hygiene is not great.  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 She has found when she is feeling more down, coffee doesn't help her wake up and get going.   Patient reports that suicidal thoughts have happened before and they are starting to occur again, off and on.  Patient reports that she has thought of things she could do and she denies any specific suicidal plan or intent. She identifies her kids and  as a reason to live. She states that she talks with her . She will also talk with her parents or her in-laws and this is helpful to her mood.   She has previously done counseling and was using her EAP benefits. She states that her employer is allowing her to have unlimited EAP visits and she would like to do this as it's free. She states that she will do this to start talking with someone soon. Patient is aware that she may also schedule with this writer as well. Patient is open to meeting with a psychiatrist and getting her medications figured out. Suggested tracking her mood with a mood chart. Patient agrees to call 911 or present to the ER if she has worsening suicidal thoughts with a plan. Patient was also sent a [employer] message with additional crisis resources. She also was open to sleep information which this writer will send via [employer].    EMORY Duarte, Behavioral Health  Clinician

## 2020-08-25 NOTE — TELEPHONE ENCOUNTER
Per RF- we can see her next week. She can come at 1:40 on 09/01.  Will mychart her back.  Lilia Strong, [facility]

## 2020-09-01 ENCOUNTER — OFFICE VISIT (OUTPATIENT)
Dept: FAMILY MEDICINE | Facility: CLINIC | Age: 35
End: 2020-09-01
Payer: COMMERCIAL

## 2020-09-01 VITALS
TEMPERATURE: 98.5 F | SYSTOLIC BLOOD PRESSURE: 118 MMHG | HEART RATE: 88 BPM | BODY MASS INDEX: 46.33 KG/M2 | OXYGEN SATURATION: 96 % | WEIGHT: 288.25 LBS | DIASTOLIC BLOOD PRESSURE: 86 MMHG | RESPIRATION RATE: 18 BRPM | HEIGHT: 66 IN

## 2020-09-01 DIAGNOSIS — F31.60 BIPOLAR AFFECTIVE DISORDER, CURRENT EPISODE MIXED, CURRENT EPISODE SEVERITY UNSPECIFIED (H): Primary | ICD-10-CM

## 2020-09-01 PROCEDURE — 99213 OFFICE O/P EST LOW 20 MIN: CPT | Performed by: FAMILY MEDICINE

## 2020-09-01 RX ORDER — PAROXETINE 10 MG/1
TABLET, FILM COATED ORAL
Qty: 12 TABLET | Refills: 0 | Status: SHIPPED | OUTPATIENT
Start: 2020-09-01 | End: 2021-01-20

## 2020-09-01 ASSESSMENT — ANXIETY QUESTIONNAIRES
3. WORRYING TOO MUCH ABOUT DIFFERENT THINGS: SEVERAL DAYS
2. NOT BEING ABLE TO STOP OR CONTROL WORRYING: SEVERAL DAYS
6. BECOMING EASILY ANNOYED OR IRRITABLE: NEARLY EVERY DAY
5. BEING SO RESTLESS THAT IT IS HARD TO SIT STILL: SEVERAL DAYS
7. FEELING AFRAID AS IF SOMETHING AWFUL MIGHT HAPPEN: SEVERAL DAYS
GAD7 TOTAL SCORE: 11
1. FEELING NERVOUS, ANXIOUS, OR ON EDGE: MORE THAN HALF THE DAYS
IF YOU CHECKED OFF ANY PROBLEMS ON THIS QUESTIONNAIRE, HOW DIFFICULT HAVE THESE PROBLEMS MADE IT FOR YOU TO DO YOUR WORK, TAKE CARE OF THINGS AT HOME, OR GET ALONG WITH OTHER PEOPLE: SOMEWHAT DIFFICULT

## 2020-09-01 ASSESSMENT — MIFFLIN-ST. JEOR: SCORE: 2022.89

## 2020-09-01 ASSESSMENT — PATIENT HEALTH QUESTIONNAIRE - PHQ9
SUM OF ALL RESPONSES TO PHQ QUESTIONS 1-9: 17
5. POOR APPETITE OR OVEREATING: MORE THAN HALF THE DAYS

## 2020-09-01 ASSESSMENT — PAIN SCALES - GENERAL: PAINLEVEL: NO PAIN (1)

## 2020-09-01 NOTE — PROGRESS NOTES
"Subjective     Maria T Armando is a 35 year old female who presents to clinic today for the following health issues:    HPI       Chief Complaint   Patient presents with     Depression     she was switched from 30 to 40 mg paxil and has been getting worse. She states she is feeling bipolar. She is having issues with sleep. She at times has trouble going to sleep or staying asleep. Sometimes she isnt tired and at times she can sleep the whole day. Her depression issues are not getting better. She is feeling more down. She was looking for a referral for psych.     Patient that I think she may have bipolar disorder.  She states she is up for 4 days and then she crashes for 4 days she will has been working with our psychologist and they recommended psychiatric evaluation we will get into the psychiatry she is not suicidal he is working outside the home is not overly concerned wants to stop her Paxil but I told her not to stop it suddenly we will taper off I did give her a tapered dose.  We did talk about her psychosocial situation which is pretty good.          Review of Systems   Constitutional, HEENT, cardiovascular, pulmonary, gi and gu systems are negative, except as otherwise noted.      Objective    BP (!) 124/98   Pulse 88   Temp 98.5  F (36.9  C) (Tympanic)   Resp 18   Ht 1.666 m (5' 5.6\")   Wt 130.7 kg (288 lb 4 oz)   LMP 08/28/2020   SpO2 96%   BMI 47.09 kg/m    Body mass index is 47.09 kg/m .  Physical Exam   GENERAL: healthy, alert and no distress            Assessment & Plan     Bipolar affective disorder, current episode mixed, current episode severity unspecified (H)    Possible bipolar disorder we will taper off her Paxil she will need to see a psychiatrist for definitive diagnosis.  I do not have the ability to diagnose bipolar disorder as have not seen enough.  - PARoxetine (PAXIL) 10 MG tablet; 2 tabs daily for three days then one tab daily for three days the one tab every other day for " four days     I did ask her about suicide she has no concerns.      Depression Screening Follow Up    PHQ 9/1/2020   PHQ-9 Total Score 17   Q9: Thoughts of better off dead/self-harm past 2 weeks Several days   F/U: Thoughts of suicide or self-harm -   F/U: Self harm-plan -   F/U: Self-harm action -   F/U: Safety concerns -       For will call me if she has any question concerns or feels suicidal in any way.    Fernando Quigley MD, MD  [facility]

## 2020-09-02 ASSESSMENT — ANXIETY QUESTIONNAIRES: GAD7 TOTAL SCORE: 11

## 2020-09-08 ENCOUNTER — TELEPHONE (OUTPATIENT)
Dept: FAMILY MEDICINE | Facility: CLINIC | Age: 35
End: 2020-09-08

## 2020-09-08 DIAGNOSIS — F33.1 MODERATE EPISODE OF RECURRENT MAJOR DEPRESSIVE DISORDER (H): ICD-10-CM

## 2020-09-08 DIAGNOSIS — F31.60 BIPOLAR AFFECTIVE DISORDER, CURRENT EPISODE MIXED, CURRENT EPISODE SEVERITY UNSPECIFIED (H): Primary | ICD-10-CM

## 2020-09-08 DIAGNOSIS — F33.0 MAJOR DEPRESSIVE DISORDER, RECURRENT EPISODE, MILD (H): ICD-10-CM

## 2020-09-08 DIAGNOSIS — F32.A DEPRESSION, UNSPECIFIED DEPRESSION TYPE: ICD-10-CM

## 2020-09-08 NOTE — TELEPHONE ENCOUNTER
Reason for Call: Request for an order or referral:    Order or referral being requested: Found a Psychiatrist that will take her.  [facility] - Fax: [phone]  Phone: [phone]    Date needed: as soon as possible    Has the patient been seen by the PCP for this problem? YES    Additional comments: Needing a referral and records    Phone number Patient can be reached at:  Home number on file [phone] (home)    Best Time:  any    Can we leave a detailed message on this number?  YES    Call taken on 9/8/2020 at 4:00 PM by Chen Worley

## 2020-09-09 ENCOUNTER — MYC MEDICAL ADVICE (OUTPATIENT)
Dept: FAMILY MEDICINE | Facility: CLINIC | Age: 35
End: 2020-09-09

## 2020-09-09 NOTE — TELEPHONE ENCOUNTER
Dr. Quigley please review referral and sign, I will fax information to facility patient requested.   Geri LOPEZ

## 2020-09-11 NOTE — TELEPHONE ENCOUNTER
Maria T is calling regarding the referral she requested for [facility] - Fax: [phone]  Phone: [phone], she said she need the referral faxed and she is contacting medical records to have her records sent. Maria T spoke to [facility] and they will contact her to schedule an appointment once the referral and records have been received.    Maria T said she is doing better than she was last weekend, she still has some issues with vertigo, nausea and headaches, she also said she is in the last phase of tapering off her medication. (See mychart encounter from 9/9/20)

## 2020-09-13 ENCOUNTER — TRANSFERRED RECORDS (OUTPATIENT)
Dept: HEALTH INFORMATION MANAGEMENT | Facility: CLINIC | Age: 35
End: 2020-09-13

## 2020-09-13 ENCOUNTER — NURSE TRIAGE (OUTPATIENT)
Dept: NURSING | Facility: CLINIC | Age: 35
End: 2020-09-13

## 2020-09-13 NOTE — TELEPHONE ENCOUNTER
Patient states she thinks she is experiencing severe withdrawal from an antidepressant they took her off of.   Patient was on Paxil for about 7 years.   Patient was taking 40mg daily. Patient was put on a taper 2 weeks ago and took the last pill today.   Had some hallucinations this morning.     Patient states she is having dizziness, headache, shaking, nausea.   Patient has been having these sx on and off for a week. Worse today.     Patient states she has thoughts of harming self.  Denies having a plan.     Patient called 911 while on the phone with RN to have paramedics come and evaluate her and get her to the Emergency Room.      RN stayed on the phone with the patient until EMS arrived.     Ann Mane RN 09/13/20 5:35 PM  [facility] Nurse Advisor      Reason for Disposition    Feeling very shaky (i.e., visible tremors of hands)    Patient is threatening suicide now    Additional Information    Negative: Coma (e.g., not moving, not talking, not responding to stimuli)    Negative: Difficult to awaken or acting confused (e.g., disoriented, slurred speech)    Negative: Seeing, hearing, or feeling things that are not there (i.e., visual, auditory, or tactile hallucinations)    Negative: Slow, shallow and weak breathing    Negative: Seizure    Negative: Violent behavior, or threatening to physically hurt or kill someone    Negative: Sounds like a life-threatening emergency to the triager    Suicide thoughts, threats and attempts, questions or concerns about    Negative: Patient attempted suicide    Negative: Violent behavior, or threatening to physically hurt or kill someone    Negative: [1] Patient is very confused (disoriented, slurred speech) AND [2] no other adult (e.g., friend or family member) available    Negative: [1] Difficult to awaken or acting very confused (disoriented, slurred speech) AND [2] new onset    Negative: Sounds like a life-threatening emergency to the triager    Protocols used:  SUBSTANCE ABUSE AND [id number], SUICIDE [id number]

## 2020-09-14 ENCOUNTER — TELEPHONE (OUTPATIENT)
Dept: FAMILY MEDICINE | Facility: CLINIC | Age: 35
End: 2020-09-14

## 2020-09-14 NOTE — TELEPHONE ENCOUNTER
Called and relayed message. No further questions.  Lilia Strong [facility]      Per RF- if she hasnt taken them, she can hold onto them and take if she needs to.  Lilia Strong [facility]

## 2020-09-14 NOTE — TELEPHONE ENCOUNTER
Reason for Call:  Other call back    Detailed comments: Patient calling stating she was in the emergency room in [facility] and they gave her PARoxetine (PAXIL) 10 MG tablet once daily to help with withdrawal symptoms and is wondering dr kelley thinks this is a good idea, please advise     Phone Number Patient can be reached at: Cell number on file:    Telephone Information:   Mobile [phone]       Best Time: anytime     Can we leave a detailed message on this number? YES    Call taken on 9/14/2020 at 1:01 PM by Miranda Seipel Vaccari

## 2020-09-15 ENCOUNTER — MYC MEDICAL ADVICE (OUTPATIENT)
Dept: FAMILY MEDICINE | Facility: CLINIC | Age: 35
End: 2020-09-15

## 2020-09-29 ENCOUNTER — OFFICE VISIT (OUTPATIENT)
Dept: FAMILY MEDICINE | Facility: CLINIC | Age: 35
End: 2020-09-29
Payer: COMMERCIAL

## 2020-09-29 VITALS
HEART RATE: 96 BPM | DIASTOLIC BLOOD PRESSURE: 82 MMHG | WEIGHT: 289 LBS | BODY MASS INDEX: 47.22 KG/M2 | SYSTOLIC BLOOD PRESSURE: 118 MMHG | OXYGEN SATURATION: 97 % | TEMPERATURE: 98.3 F | RESPIRATION RATE: 14 BRPM

## 2020-09-29 DIAGNOSIS — Z23 NEED FOR VACCINATION: ICD-10-CM

## 2020-09-29 DIAGNOSIS — F33.1 MAJOR DEPRESSIVE DISORDER, RECURRENT EPISODE, MODERATE (H): ICD-10-CM

## 2020-09-29 DIAGNOSIS — Z00.00 ROUTINE GENERAL MEDICAL EXAMINATION AT A HEALTH CARE FACILITY: Primary | ICD-10-CM

## 2020-09-29 DIAGNOSIS — E66.01 MORBID OBESITY (H): ICD-10-CM

## 2020-09-29 PROCEDURE — 90472 IMMUNIZATION ADMIN EACH ADD: CPT | Performed by: FAMILY MEDICINE

## 2020-09-29 PROCEDURE — 90714 TD VACC NO PRESV 7 YRS+ IM: CPT | Performed by: FAMILY MEDICINE

## 2020-09-29 PROCEDURE — 90686 IIV4 VACC NO PRSV 0.5 ML IM: CPT | Performed by: FAMILY MEDICINE

## 2020-09-29 PROCEDURE — 99395 PREV VISIT EST AGE 18-39: CPT | Mod: 25 | Performed by: FAMILY MEDICINE

## 2020-09-29 PROCEDURE — 90471 IMMUNIZATION ADMIN: CPT | Performed by: FAMILY MEDICINE

## 2020-09-29 RX ORDER — FLUOXETINE 10 MG/1
20 CAPSULE ORAL
COMMUNITY
Start: 2020-09-23

## 2020-09-29 RX ORDER — CLONIDINE HYDROCHLORIDE 0.1 MG/1
0.1 TABLET ORAL 2 TIMES DAILY
COMMUNITY
Start: 2020-09-23

## 2020-09-29 ASSESSMENT — ENCOUNTER SYMPTOMS
COUGH: 0
HEMATURIA: 0
CONSTIPATION: 0
HEADACHES: 1
NERVOUS/ANXIOUS: 0
NAUSEA: 1
DYSURIA: 0
DIZZINESS: 1
PARESTHESIAS: 0
FEVER: 0
FREQUENCY: 0
PALPITATIONS: 1
DIARRHEA: 0
HEARTBURN: 0
CHILLS: 0
WEAKNESS: 1
ABDOMINAL PAIN: 0
BREAST MASS: 0
JOINT SWELLING: 0
SHORTNESS OF BREATH: 1
EYE PAIN: 0
MYALGIAS: 0
HEMATOCHEZIA: 0
ARTHRALGIAS: 0
SORE THROAT: 0

## 2020-09-29 ASSESSMENT — PATIENT HEALTH QUESTIONNAIRE - PHQ9
SUM OF ALL RESPONSES TO PHQ QUESTIONS 1-9: 21
10. IF YOU CHECKED OFF ANY PROBLEMS, HOW DIFFICULT HAVE THESE PROBLEMS MADE IT FOR YOU TO DO YOUR WORK, TAKE CARE OF THINGS AT HOME, OR GET ALONG WITH OTHER PEOPLE: EXTREMELY DIFFICULT
SUM OF ALL RESPONSES TO PHQ QUESTIONS 1-9: 21

## 2020-09-29 ASSESSMENT — PAIN SCALES - GENERAL: PAINLEVEL: MILD PAIN (2)
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
## 2020-09-30 ASSESSMENT — PATIENT HEALTH QUESTIONNAIRE - PHQ9: SUM OF ALL RESPONSES TO PHQ QUESTIONS 1-9: 21

## 2020-12-01 ENCOUNTER — E-VISIT (OUTPATIENT)
Dept: FAMILY MEDICINE | Facility: CLINIC | Age: 35
End: 2020-12-01
Payer: COMMERCIAL

## 2020-12-01 DIAGNOSIS — N39.0 URINARY TRACT INFECTION WITHOUT HEMATURIA, SITE UNSPECIFIED: Primary | ICD-10-CM

## 2020-12-01 PROCEDURE — 99421 OL DIG E/M SVC 5-10 MIN: CPT | Performed by: FAMILY MEDICINE

## 2020-12-01 RX ORDER — CIPROFLOXACIN 250 MG/1
250 TABLET, FILM COATED ORAL 2 TIMES DAILY
Qty: 10 TABLET | Refills: 0 | Status: SHIPPED | OUTPATIENT
Start: 2020-12-01 | End: 2021-01-20

## 2020-12-01 RX ORDER — PHENAZOPYRIDINE HYDROCHLORIDE 200 MG/1
200 TABLET, FILM COATED ORAL 3 TIMES DAILY PRN
Qty: 15 TABLET | Refills: 0 | Status: SHIPPED | OUTPATIENT
Start: 2020-12-01 | End: 2021-01-20

## 2021-01-04 ENCOUNTER — MYC MEDICAL ADVICE (OUTPATIENT)
Dept: FAMILY MEDICINE | Facility: CLINIC | Age: 36
End: 2021-01-04

## 2021-01-20 ENCOUNTER — VIRTUAL VISIT (OUTPATIENT)
Dept: FAMILY MEDICINE | Facility: CLINIC | Age: 36
End: 2021-01-20
Payer: COMMERCIAL

## 2021-01-20 DIAGNOSIS — E66.01 MORBID OBESITY (H): ICD-10-CM

## 2021-01-20 DIAGNOSIS — F33.1 MAJOR DEPRESSIVE DISORDER, RECURRENT EPISODE, MODERATE (H): ICD-10-CM

## 2021-01-20 PROCEDURE — 99213 OFFICE O/P EST LOW 20 MIN: CPT | Mod: 95 | Performed by: FAMILY MEDICINE

## 2021-01-20 NOTE — PROGRESS NOTES
Maria T is a 35 year old who is being evaluated via a billable video visit.      How would you like to obtain your AVS? MyChart  If the video visit is dropped, the invitation should be resent by: Text to cell phone: [phone]  Will anyone else be joining your video visit? No      Video Start Time: 11:14  Assessment & Plan     Major depressive disorder, recurrent episode, moderate (H)      Morbid obesity (H)    Patient states her medications are working fine but she does carry a diagnosis of morbid obesity also has an listed with a  has lost 5 pounds over the past couple of weeks and is looking forward to working with her for the remainder the year and beyond if needed.  We will put a letter of necessity in her chart for this so she has it for tax purposes.  Because HSA dollars are appropriate for this.  I did spend time reviewing her past medications and make sure that she had medications that she needed.  I also looked at old records today to see where she is in regards to her weight and her polycystic ovarian disease diagnosis.      Fernando Quigley MD  [facility]     Maria T is a 35 year old who presents to clinic today for the following health issues  accompanied by her self:    HPI       Chief Complaint   Patient presents with     Video Visit     Patient Request for Note/Letter     pt is looking for a letter of necessity to have on file in case of an audit. She would like to use her HSA dollars to pay for personal training.            Review of Systems   Constitutional, HEENT, cardiovascular, pulmonary, gi and gu systems are negative, except as otherwise noted.      Objective           Vitals:  No vitals were obtained today due to virtual visit.    Physical Exam   GENERAL: Healthy, alert and no distress  EYES: Eyes grossly normal to inspection.  No discharge or erythema, or obvious scleral/conjunctival abnormalities.  RESP: No audible wheeze, cough, or  visible cyanosis.  No visible retractions or increased work of breathing.    SKIN: Visible skin clear. No significant rash, abnormal pigmentation or lesions.  NEURO: Cranial nerves grossly intact.  Mentation and speech appropriate for age.  PSYCH: Mentation appears normal, affect normal/bright, judgement and insight intact, normal speech and appearance well-groomed.                Video-Visit Details    Type of service:  Video Visit    Video End Time:11:25    Originating Location (pt. Location): Home    Distant Location (provider location):  [facility]     Platform used for Video Visit: Jerald

## 2021-01-20 NOTE — LETTER
[address] [address]  Phone: [phone]  Fax: [phone]    January 20, 2021        Maria T Armando  [address] [place] [address]  [facility] [address]          To whom it may concern:    RE: Maria T Armando    The patient carries a diagnosis of morbid obesity and mild major depression.  She has enlisted the help of a  for weight loss.  We know that daily aerobic exercise also improves depression.  She is using HSA dollars to cover this which is completely appropriate from a medical standpoint.  She is beginning to lose weight I did wish her well as she moves forward this year with her workout schedule and her .    Please contact me for questions or concerns.      Sincerely,        Fernando Quigley MD

## 2021-05-03 DIAGNOSIS — N92.6 IRREGULAR MENSTRUAL CYCLE: ICD-10-CM

## 2021-05-03 RX ORDER — METFORMIN HYDROCHLORIDE 750 MG/1
TABLET, EXTENDED RELEASE ORAL
Qty: 90 TABLET | Refills: 1 | Status: SHIPPED | OUTPATIENT
Start: 2021-05-03 | End: 2021-11-01

## 2021-06-29 ENCOUNTER — OFFICE VISIT (OUTPATIENT)
Dept: FAMILY MEDICINE | Facility: CLINIC | Age: 36
End: 2021-06-29
Payer: COMMERCIAL

## 2021-06-29 VITALS
HEART RATE: 110 BPM | OXYGEN SATURATION: 98 % | RESPIRATION RATE: 18 BRPM | TEMPERATURE: 98.3 F | WEIGHT: 269.4 LBS | SYSTOLIC BLOOD PRESSURE: 128 MMHG | DIASTOLIC BLOOD PRESSURE: 86 MMHG | BODY MASS INDEX: 44.01 KG/M2

## 2021-06-29 DIAGNOSIS — Z87.440 PERSONAL HISTORY OF URINARY TRACT INFECTION: Primary | ICD-10-CM

## 2021-06-29 DIAGNOSIS — R03.0 ELEVATED BP WITHOUT DIAGNOSIS OF HYPERTENSION: ICD-10-CM

## 2021-06-29 LAB
ALBUMIN UR-MCNC: NEGATIVE MG/DL
APPEARANCE UR: CLEAR
BACTERIA #/AREA URNS HPF: ABNORMAL /HPF
BILIRUB UR QL STRIP: NEGATIVE
COLOR UR AUTO: ABNORMAL
GLUCOSE UR STRIP-MCNC: NEGATIVE MG/DL
HGB UR QL STRIP: NEGATIVE
KETONES UR STRIP-MCNC: NEGATIVE MG/DL
LEUKOCYTE ESTERASE UR QL STRIP: ABNORMAL
NITRATE UR QL: NEGATIVE
PH UR STRIP: 6 PH (ref 5–7)
RBC #/AREA URNS AUTO: 1 /HPF (ref 0–2)
SOURCE: ABNORMAL
SP GR UR STRIP: 1 (ref 1–1.03)
SQUAMOUS #/AREA URNS AUTO: 1 /HPF (ref 0–1)
UROBILINOGEN UR STRIP-MCNC: 0 MG/DL (ref 0–2)
WBC #/AREA URNS AUTO: 5 /HPF (ref 0–5)

## 2021-06-29 PROCEDURE — 99213 OFFICE O/P EST LOW 20 MIN: CPT | Performed by: FAMILY MEDICINE

## 2021-06-29 PROCEDURE — 81001 URINALYSIS AUTO W/SCOPE: CPT | Performed by: FAMILY MEDICINE

## 2021-06-29 RX ORDER — LORATADINE 10 MG/1
10 TABLET ORAL DAILY
COMMUNITY

## 2021-06-29 ASSESSMENT — PAIN SCALES - GENERAL: PAINLEVEL: NO PAIN (1)

## 2021-06-29 NOTE — PROGRESS NOTES
"    Assessment & Plan     Personal history of urinary tract infection  Per patient report, she has had multiple UTIs that was treated virtually.  No UA or urine culture done or documented.  She would like to be referred to urologist.  Still has urinary frequency.  UA today was pretty normal.  She was treated with antibiotic for UTI recently.  Encouraged to drink a lot of water.    I recommend her to see the urologist of her choice if she has concerned about it.  I would not recommend prophylactic antibiotic at this time due to no documentation of UTI.  Encouraged to let me know if she needs a referral to see the urologist.       - UA with Microscopic reflex to Culture (Kymberly Phelps; [facility])    Elevated BP without diagnosis of hypertension  Blood pressure is normal today.  High risk for developing high blood pressure due to PCOS and morbid obesity.  Emphasized on healthy and low-salt diet, exercising, and weight loss.  Recommended to monitor her blood pressure closely, call in if if it is persistently above 140/90.       BMI:   Estimated body mass index is 44.01 kg/m  as calculated from the following:    Height as of 9/1/20: 1.666 m (5' 5.6\").    Weight as of this encounter: 122.2 kg (269 lb 6.4 oz).   Weight management plan: Patient was referred to their PCP to discuss a diet and exercise plan.        Return in about 3 months (around 9/29/2021) for Physical Exam.    Layne Bullock Mai, MD  [facility]    Ebony Live is a 36 year old who presents for the following health issues     HPI     ED/UC Followup:    Facility:  [facility]  Date of visit: 05/25/21  Reason for visit: Weakness  Current Status: Patient is looking to have labs repeated.        Maria T was seen today for ER follow-up.  She presented to the ER 4 days ago for not feeling well and elevated blood pressure.  She works as a CNA and did not feel very well that day so she checked her blood pressure and it was " over 200/100.  No associated headache or dizziness, but was having some urine frequency.  In the ER, her blood pressure was at 150/96.  She was reassured, no medication for blood pressure started.  She was diagnosed with UTI and was treated with antibiotic.  Been taking medication prescribed, but still has the urinary frequency but no urgency or dysuria.  Stated she been treated for UTI multiple times virtually.  She is concerned that she had recurrent UTI.  She would like to be referred to urologist for it - would to be referred to her the Urologist that her  is seeing.  No nausea or vomiting.  No obvious hematuria.  No lower back pain.  No diarrhea or constipation.  No other concern.    Review of Systems   Constitutional, HEENT, cardiovascular, pulmonary, gi and gu systems are negative, except as otherwise noted.      Objective    /84   Pulse 110   Temp 98.3  F (36.8  C) (Temporal)   Resp 18   Wt 122.2 kg (269 lb 6.4 oz)   LMP 06/01/2021   SpO2 98%   BMI 44.01 kg/m    Body mass index is 44.01 kg/m .  Physical Exam   GENERAL: healthy, alert and no distress  RESP: lungs clear to auscultation - no rales, rhonchi or wheezes  CV: regular rate and rhythm, normal S1 S2, no S3 or S4, no murmur, click or rub, no peripheral edema  ABDOMEN: soft, nontender, obese, no palpable masses organomegaly with normal bowel sounds.  No CVA tenderness.  MS: no gross musculoskeletal defects noted, no edema.  Hips and lower back exams were normal.    Results for orders placed or performed in visit on 06/29/21   UA with Microscopic reflex to Culture (Kymberly Phelps; [facility])     Status: Abnormal    Specimen: Midstream Urine   Result Value Ref Range    Color Urine Straw     Appearance Urine Clear     Glucose Urine Negative NEG^Negative mg/dL    Bilirubin Urine Negative NEG^Negative    Ketones Urine Negative NEG^Negative mg/dL    Specific Gravity Urine 1.004 1.003 - 1.035    Blood Urine Negative NEG^Negative    pH  Urine 6.0 5.0 - 7.0 pH    Protein Albumin Urine Negative NEG^Negative mg/dL    Urobilinogen mg/dL 0.0 0.0 - 2.0 mg/dL    Nitrite Urine Negative NEG^Negative    Leukocyte Esterase Urine Trace (A) NEG^Negative    Source Midstream Urine     WBC Urine 5 0 - 5 /HPF    RBC Urine 1 0 - 2 /HPF    Bacteria Urine Few (A) NEG^Negative /HPF    Squamous Epithelial /HPF Urine 1 0 - 1 /HPF

## 2021-09-28 ENCOUNTER — NURSE TRIAGE (OUTPATIENT)
Dept: NURSING | Facility: CLINIC | Age: 36
End: 2021-09-28

## 2021-09-28 NOTE — TELEPHONE ENCOUNTER
Patient called to report shortness of breath started at 3am. Patient has had a severe headache since Friday. Patient describes as worst headache in her life.    Per protocol patient to go to ED now. Patient states  will drive her there.    Sujey Gonzalez RN   09/28/21 5:05 AM  [facility] Nurse Advisor  COVID 19 Nurse Triage Plan/Patient Instructions    Please be aware that novel coronavirus (COVID-19) may be circulating in the community. If you develop symptoms such as fever, cough, or SOB or if you have concerns about the presence of another infection including coronavirus (COVID-19), please contact your health care provider or visit [email or web address].     Disposition/Instructions    Home care recommended. Follow home care protocol based instructions.    Thank you for taking steps to prevent the spread of this virus.  o Limit your contact with others.  o Wear a simple mask to cover your cough.  o Wash your hands well and often.    Resources    [facility]: About COVID-19: [email or web address]    CDC: What to Do If You're Sick: www.cdc.gov/coronavirus/2019-ncov/about/steps-when-sick.html    CDC: Ending Home Isolation: www.cdc.gov/coronavirus/2019-ncov/hcp/disposition-in-home-patients.html     CDC: Caring for Someone: www.cdc.gov/coronavirus/2019-ncov/if-you-are-sick/care-for-someone.html     [facility]: Interim Guidance for Hospital Discharge to Home: www.health.[facility]/diseases/coronavirus/hcp/hospdischarge.pdf    [facility] clinical trials (COVID-19 research studies): [email or web address].[email or web address]/umn-clinical-trials     Below are the COVID-19 hotlines at the [facility] ([facility]). Interpreters are available.   o For health questions: Call 345-016-0325 or 1-441.381.6794 (7 a.m. to 7 p.m.)  o For questions about schools and childcare: Call 740-065-5731 or 1-819.874.9889 (7 a.m. to 7 p.m.)                     Reason for Disposition    [1] SEVERE headache  "(e.g., excruciating) AND [2] \"worst headache\" of life    Additional Information    Negative: Difficult to awaken or acting confused (e.g., disoriented, slurred speech)    Negative: [1] Weakness of the face, arm or leg on one side of the body AND [2] new onset    Negative: [1] Numbness of the face, arm or leg on one side of the body AND [2] new onset    Negative: [1] Loss of speech or garbled speech AND [2] new onset    Negative: Passed out (i.e., lost consciousness, collapsed and was not responding)    Negative: Sounds like a life-threatening emergency to the triager    Negative: Followed a head injury    Negative: Pregnant    Negative: Postpartum (from 0 to 6 weeks after delivery)    Negative: Traumatic Brain Injury (TBI) is suspected    Negative: Unable to walk, or can only walk with assistance (e.g., requires support)    Negative: Stiff neck (can't touch chin to chest)    Negative: Severe pain in one eye    Negative: [1] Other family members (or roommates) with headaches AND [2] possibility of carbon monoxide exposure    Protocols used: HEADACHE-A-AH      "

## 2021-10-23 ENCOUNTER — HEALTH MAINTENANCE LETTER (OUTPATIENT)
Age: 36
End: 2021-10-23

## 2021-11-01 DIAGNOSIS — N92.6 IRREGULAR MENSTRUAL CYCLE: ICD-10-CM

## 2021-11-01 RX ORDER — METFORMIN HYDROCHLORIDE 750 MG/1
TABLET, EXTENDED RELEASE ORAL
Qty: 90 TABLET | Refills: 0 | Status: SHIPPED | OUTPATIENT
Start: 2021-11-01 | End: 2022-03-31

## 2021-11-01 NOTE — TELEPHONE ENCOUNTER
Prescription approved per [facility] Refill Protocol.    Lata Bey RN

## 2021-12-18 ENCOUNTER — HEALTH MAINTENANCE LETTER (OUTPATIENT)
Age: 36
End: 2021-12-18

## 2022-03-31 DIAGNOSIS — N92.6 IRREGULAR MENSTRUAL CYCLE: ICD-10-CM

## 2022-03-31 RX ORDER — METFORMIN HYDROCHLORIDE 750 MG/1
TABLET, EXTENDED RELEASE ORAL
Qty: 90 TABLET | Refills: 1 | Status: SHIPPED | OUTPATIENT
Start: 2022-03-31 | End: 2022-10-23

## 2022-06-06 NOTE — TELEPHONE ENCOUNTER
Patient is scheduled with Dr. Carrasco on 6/23 at 2pm arrive at 12pm, here at [facility] on the 1st floor at [facility]. Patient expressed understanding and all scheduling questions answered.    Pre-procedural Clearance; Not needed per scheduling instructions.    COVID Testing; Vaccinated  Location:     NOTE:  Neuro monitoring Needed: No  Case ID:     Consent faxed to Medical records: N/A  Confirmation received:     Patient Placement form faxed: N/A  Confirmation received:     Bed reserved in NICU: N/A     Referral faxed to [facility].   Geri LOPEZ

## 2022-10-09 ENCOUNTER — HEALTH MAINTENANCE LETTER (OUTPATIENT)
Age: 37
End: 2022-10-09

## 2022-10-20 DIAGNOSIS — N92.6 IRREGULAR MENSTRUAL CYCLE: ICD-10-CM

## 2022-10-23 RX ORDER — METFORMIN HYDROCHLORIDE 750 MG/1
TABLET, EXTENDED RELEASE ORAL
Qty: 90 TABLET | Refills: 0 | Status: SHIPPED | OUTPATIENT
Start: 2022-10-23 | End: 2023-01-30

## 2022-10-23 NOTE — TELEPHONE ENCOUNTER
"Requested Prescriptions   Pending Prescriptions Disp Refills    metFORMIN (GLUCOPHAGE-XR) 750 MG 24 hr tablet [Pharmacy Med Name: METFORMIN HCL  MG TABLET] 90 tablet 1     Sig: TAKE 1 TABLET BY MOUTH EVERY DAY WITH DINNER       Biguanide Agents Failed - 10/20/2022 11:31 PM        Failed - Recent (12 mo) or future (30 days) visit within the authorizing provider's specialty      Patient has had an office visit with the authorizing provider or a provider within the authorizing providers department within the previous 12 mos or has a future within next 30 days. See \"Patient Info\" tab in inbasket, or \"Choose Columns\" in Meds & Orders section of the refill encounter.              Passed - Patient is age 10 or older        Passed - Patient does NOT have a diagnosis of CHF.        Passed - Medication is active on med list        Passed - Patient is not pregnant        Passed - Patient has not had a positive pregnancy test within the past 12 mos.               Laurie Quezada RN  "

## 2022-10-24 NOTE — TELEPHONE ENCOUNTER
Patient hasn't been seen by Dr. Quigley since 9/29/2020 for a physical.  She needs to schedule her yearly appt and have labs drawn.  I will fill a 3 month supply of the medication for her.  Please schedule her an appt.    Electronically signed by:  Gurdeep Matias M.D.  10/23/2022

## 2022-10-26 ENCOUNTER — MYC MEDICAL ADVICE (OUTPATIENT)
Dept: FAMILY MEDICINE | Facility: CLINIC | Age: 37
End: 2022-10-26

## 2023-01-28 DIAGNOSIS — N92.6 IRREGULAR MENSTRUAL CYCLE: ICD-10-CM

## 2023-01-30 RX ORDER — METFORMIN HYDROCHLORIDE 750 MG/1
TABLET, EXTENDED RELEASE ORAL
Qty: 90 TABLET | Refills: 0 | Status: SHIPPED | OUTPATIENT
Start: 2023-01-30

## 2023-01-30 NOTE — TELEPHONE ENCOUNTER
No further refills will be allowed unless this patient schedule follow-up with Dr. Quigley.  I noticed that the refill went to [facility].  If the patient is no longer living in [place], we can no longer refill his medications for her.    Electronically signed by:  Gurdeep Matias M.D.  1/30/2023

## 2023-01-30 NOTE — TELEPHONE ENCOUNTER
Pending Prescriptions:                       Disp   Refills    metFORMIN (GLUCOPHAGE-XR) 750 MG 24 hr tab*90 tab*0        Sig: TAKE 1 TABLET BY MOUTH EVERY DAY WITH DINNER      Routing refill request to provider for review/approval because:  Blood pressure is not current    Lisa Eisenberg RN on 1/30/2023 at 12:19 PM

## 2023-02-01 ENCOUNTER — MYC MEDICAL ADVICE (OUTPATIENT)
Dept: FAMILY MEDICINE | Facility: CLINIC | Age: 38
End: 2023-02-01
Payer: COMMERCIAL

## 2023-02-01 NOTE — TELEPHONE ENCOUNTER
Patient informed via mychart to schedule. 2/6 reminder if not read to send letter.     Closing encounter.   Zamzam Benitez MA

## 2023-02-18 ENCOUNTER — HEALTH MAINTENANCE LETTER (OUTPATIENT)
Age: 38
End: 2023-02-18

## 2024-03-16 ENCOUNTER — HEALTH MAINTENANCE LETTER (OUTPATIENT)
Age: 39
End: 2024-03-16

## 2024-07-12 NOTE — PATIENT INSTRUCTIONS
